# Patient Record
Sex: FEMALE | Race: BLACK OR AFRICAN AMERICAN | NOT HISPANIC OR LATINO | Employment: FULL TIME | URBAN - METROPOLITAN AREA
[De-identification: names, ages, dates, MRNs, and addresses within clinical notes are randomized per-mention and may not be internally consistent; named-entity substitution may affect disease eponyms.]

---

## 2021-04-28 ENCOUNTER — IMMUNIZATION (OUTPATIENT)
Dept: PRIMARY CARE CLINIC | Facility: CLINIC | Age: 21
End: 2021-04-28
Payer: MEDICAID

## 2021-04-28 DIAGNOSIS — Z23 NEED FOR VACCINATION: Primary | ICD-10-CM

## 2021-04-28 PROCEDURE — 91301 PR SARS-COV-2 COVID-19 VACCINE, NO PRSV, 100MCG/0.5ML, IM: ICD-10-PCS | Mod: S$GLB,,, | Performed by: INTERNAL MEDICINE

## 2021-04-28 PROCEDURE — 91301 PR SARS-COV-2 COVID-19 VACCINE, NO PRSV, 100MCG/0.5ML, IM: CPT | Mod: S$GLB,,, | Performed by: INTERNAL MEDICINE

## 2021-04-28 PROCEDURE — 0011A PR IMMUNIZ ADMIN, SARS-COV-2 COVID-19 VACC, 100MCG/0.5ML, 1ST DOSE: ICD-10-PCS | Mod: CV19,S$GLB,, | Performed by: INTERNAL MEDICINE

## 2021-04-28 PROCEDURE — 0011A PR IMMUNIZ ADMIN, SARS-COV-2 COVID-19 VACC, 100MCG/0.5ML, 1ST DOSE: CPT | Mod: CV19,S$GLB,, | Performed by: INTERNAL MEDICINE

## 2021-04-28 RX ADMIN — Medication 0.5 ML: at 06:04

## 2021-05-26 ENCOUNTER — IMMUNIZATION (OUTPATIENT)
Dept: INTERNAL MEDICINE | Facility: CLINIC | Age: 21
End: 2021-05-26
Payer: MEDICAID

## 2021-05-26 DIAGNOSIS — Z23 NEED FOR VACCINATION: Primary | ICD-10-CM

## 2021-05-26 PROCEDURE — 0012A COVID-19, MRNA, LNP-S, PF, 100 MCG/0.5 ML DOSE VACCINE: CPT | Mod: PBBFAC

## 2021-06-03 ENCOUNTER — HOSPITAL ENCOUNTER (OUTPATIENT)
Dept: RADIOLOGY | Facility: HOSPITAL | Age: 21
Discharge: HOME | End: 2021-06-03
Attending: STUDENT IN AN ORGANIZED HEALTH CARE EDUCATION/TRAINING PROGRAM
Payer: MEDICAID

## 2021-06-03 DIAGNOSIS — N63.10 BREAST MASS, RIGHT: ICD-10-CM

## 2021-06-03 PROCEDURE — 76642 US BREAST RIGHT LIMITED: ICD-10-PCS | Mod: 26,RT,, | Performed by: RADIOLOGY

## 2021-06-03 PROCEDURE — 76642 ULTRASOUND BREAST LIMITED: CPT | Mod: 26,RT,, | Performed by: RADIOLOGY

## 2021-06-03 PROCEDURE — 76642 ULTRASOUND BREAST LIMITED: CPT | Mod: TC,RT

## 2021-12-28 ENCOUNTER — HOSPITAL ENCOUNTER (EMERGENCY)
Facility: HOSPITAL | Age: 21
Discharge: HOME | End: 2021-12-28
Attending: EMERGENCY MEDICINE
Payer: MEDICAID

## 2021-12-28 VITALS
BODY MASS INDEX: 32.96 KG/M2 | WEIGHT: 210 LBS | HEART RATE: 72 BPM | OXYGEN SATURATION: 100 % | SYSTOLIC BLOOD PRESSURE: 150 MMHG | HEIGHT: 67 IN | DIASTOLIC BLOOD PRESSURE: 90 MMHG | RESPIRATION RATE: 19 BRPM | TEMPERATURE: 98 F

## 2021-12-28 DIAGNOSIS — Z20.822 COVID-19 VIRUS NOT DETECTED: Primary | ICD-10-CM

## 2021-12-28 LAB
CTP QC/QA: YES
SARS-COV-2 RDRP RESP QL NAA+PROBE: NEGATIVE

## 2021-12-28 PROCEDURE — U0002 COVID-19 LAB TEST NON-CDC: HCPCS | Performed by: EMERGENCY MEDICINE

## 2021-12-28 PROCEDURE — 99282 EMERGENCY DEPT VISIT SF MDM: CPT | Mod: CR,CS,, | Performed by: PHYSICIAN ASSISTANT

## 2021-12-28 PROCEDURE — 99282 EMERGENCY DEPT VISIT SF MDM: CPT

## 2021-12-28 PROCEDURE — 99282 PR EMERGENCY DEPT VISIT,LEVEL II: ICD-10-PCS | Mod: CR,CS,, | Performed by: PHYSICIAN ASSISTANT

## 2021-12-28 NOTE — Clinical Note
"Maira"alexey Rosario was seen and treated in our emergency department on 12/28/2021.     COVID-19 is present in our communities across the state. There is limited testing for COVID at this time, so not all patients can be tested. In this situation, your employee meets the following criteria:    Maira Rosario has met the criteria for COVID-19 testing and has a NEGATIVE result. The employee can return to work once they are asymptomatic for 72 hours without the use of fever reducing medications (Tylenol, Motrin, etc).     If you have any questions or concerns, or if I can be of further assistance, please do not hesitate to contact me.    Sincerely,             Renny Stark PA-C"

## 2021-12-29 NOTE — DISCHARGE INSTRUCTIONS
Your coronavirus test was NEGATIVE. This could be a false negative as the test is not 100% accurate. Given your symptoms, you should assume you have the virus and should act accordingly. Take over-the-counter medications such as Tylenol, Ibuprofen, Mucinex, Flonase, etc for further management of your symptoms. Hydrate by drinking plenty of water. Get plenty of sleep.    Wash your hands frequently.  Do not attend public events until health authorities  otherwise.    So far from what we know about coronavirus, the people who get very sick tend to do so around day 8 of the illness.  If you rapidly worsen, you should return to the emergency department for reassessment.    Return to the Emergency Department for symptoms including but not limited to: worsening symptoms, shortness of breath or chest pain, vomiting with inability to hold down fluids, passing out/fainting/unconsciousness, or other concerning symptoms.

## 2021-12-29 NOTE — ED NOTES
Patient identifiers verified and correct for Maira Rosario   C/C: Sore throat, exposure to COVID   APPEARANCE: awake and alert in NAD.  SKIN: warm, dry and intact. No breakdown or bruising.  MUSCULOSKELETAL: Patient moving all extremities spontaneously, no obvious swelling or deformities noted. Ambulates independently.  RESPIRATORY: Denies shortness of breath.Respirations unlabored.   CARDIAC: Denies CP, 2+ distal pulses; no peripheral edema  ABDOMEN: S/ND/NT, Denies nausea  : voids spontaneously, denies difficulty  Neurologic: AAO x 4; follows commands equal strength in all extremities; denies numbness/tingling. Denies dizziness

## 2021-12-29 NOTE — ED PROVIDER NOTES
Encounter Date: 12/28/2021       History     Chief Complaint   Patient presents with    COVID-19 Concerns     Pt reports covid exposure. Pt reports sore throat      The history is provided by the patient and medical records. No  was used.      Maira Rosario is a 21 y.o. female with medical history of R breast mass presenting to the ED with the chief complaint of COVID-19 concerns.     Patient reports a few days of sore throat, sinus congestion, rhinorrhea, dry cough. Here with family for COVID testing after +exposure. Reports she would not be in the ED otherwise. No chest pain or SOB.         Review of patient's allergies indicates:  No Known Allergies  No past medical history on file.  No past surgical history on file.  Family History   Problem Relation Age of Onset    Breast cancer Maternal Grandmother     Breast cancer Paternal Grandmother         Review of Systems   Constitutional: Negative for fever.   HENT: Positive for congestion, rhinorrhea and sore throat.    Respiratory: Positive for cough. Negative for shortness of breath.    Cardiovascular: Negative for chest pain.   Gastrointestinal: Negative for nausea.   Genitourinary: Negative for dysuria.   Musculoskeletal: Negative for back pain.   Skin: Negative for rash.   Neurological: Negative for weakness.   Hematological: Does not bruise/bleed easily.       Physical Exam     Initial Vitals [12/28/21 2126]   BP Pulse Resp Temp SpO2   (!) 150/90 72 19 98.1 °F (36.7 °C) 100 %      MAP       --         Physical Exam    Constitutional: She appears well-developed and well-nourished. She is not diaphoretic. No distress.   HENT:   Head: Normocephalic and atraumatic.   +1 BL tonsillar edema. No exudate. Uvula midline   Eyes: EOM are normal. Pupils are equal, round, and reactive to light.   Neck: Neck supple.   Normal range of motion.  Cardiovascular: Normal rate and regular rhythm.   Pulmonary/Chest: No respiratory distress.   Speaking full  sentences without difficulty. No accessory muscle use.   Musculoskeletal:         General: Normal range of motion.      Cervical back: Normal range of motion and neck supple.     Neurological: She is alert and oriented to person, place, and time.   Skin: Skin is warm and dry. No rash noted.         ED Course   Procedures  Labs Reviewed   SARS-COV-2 RDRP GENE    Narrative:     This test utilizes isothermal nucleic acid amplification   technology to detect the SARS-CoV-2 RdRp nucleic acid segment.   The analytical sensitivity (limit of detection) is 125 genome   equivalents/mL.   A POSITIVE result implies infection with the SARS-CoV-2 virus;   the patient is presumed to be contagious.     A NEGATIVE result means that SARS-CoV-2 nucleic acids are not   present above the limit of detection. A NEGATIVE result should be   treated as presumptive. It does not rule out the possibility of   COVID-19 and should not be the sole basis for treatment decisions.   If COVID-19 is strongly suspected based on clinical and exposure   history, re-testing using an alternate molecular assay should be   considered.   This test is only for use under the Food and Drug   Administration s Emergency Use Authorization (EUA).   Commercial kits are provided by [employer].   Performance characteristics of the EUA have been independently   verified by [facility] Department of   Pathology and Laboratory Medicine.   _________________________________________________________________   The authorized Fact Sheet for Healthcare Providers and the authorized Fact   Sheet for Patients of the ID NOW COVID-19 are available on the FDA   website:     https://www.fda.gov/media/387308/download  https://www.fda.gov/media/178424/download                Imaging Results    None          Medications - No data to display  Medical Decision Making:   History:   Old Medical Records: I decided to obtain old medical records.  Old Records Summarized:  records from clinic visits.  Clinical Tests:   Lab Tests: Ordered and Reviewed       APC / Resident Notes:   21 y.o. female with medical history of R breast mass presenting to the ED c/o few days of sore throat, sinus congestion, rhinorrhea, dry cough. Requesting COVID testing. DDx includes but not limited to viral syndrome, COVID-19, pneumonia, reactive airway disease, pharyngitis, sinusitis.    COVID-19 negative. Discussed possible other upper viral URI as likely etiology. Offered additional work-up, but patient comfortable returning home at this time which I feel is appropriate. Advised close follow-up with PCP within the next week. Patient expresses understanding and agreeable to the plan. Return to ED precautions given for new, worsening, or concerning symptoms.                    Clinical Impression:   Final diagnoses:  [Z20.822] COVID-19 virus not detected (Primary)          ED Disposition Condition    Discharge Stable        ED Prescriptions     None        Follow-up Information     Follow up With Specialties Details Why Contact Info Additional Information    [facility]   [address]  [facility] [id number] [phone] [facility] Please park in surface lot and check in at central registration desk           Renny Stark PA-C  12/29/21 0102

## 2022-01-10 DIAGNOSIS — R05.9 COUGH: ICD-10-CM

## 2022-01-10 DIAGNOSIS — R09.81 NASAL CONGESTION: ICD-10-CM

## 2022-01-10 DIAGNOSIS — R50.9 FEVER, UNSPECIFIED FEVER CAUSE: Primary | ICD-10-CM

## 2022-01-10 DIAGNOSIS — R52 BODY ACHES: ICD-10-CM

## 2022-01-10 LAB
CTP QC/QA: YES
SARS-COV-2 AG RESP QL IA.RAPID: POSITIVE

## 2022-01-10 PROCEDURE — 87811 SARS-COV-2 COVID19 W/OPTIC: CPT

## 2022-01-11 ENCOUNTER — PATIENT MESSAGE (OUTPATIENT)
Dept: PRIMARY CARE CLINIC | Facility: CLINIC | Age: 22
End: 2022-01-11
Payer: COMMERCIAL

## 2022-11-23 ENCOUNTER — OFFICE VISIT (OUTPATIENT)
Dept: PLASTIC SURGERY | Facility: CLINIC | Age: 22
End: 2022-11-23
Payer: COMMERCIAL

## 2022-11-23 VITALS
HEIGHT: 69 IN | DIASTOLIC BLOOD PRESSURE: 82 MMHG | WEIGHT: 210 LBS | BODY MASS INDEX: 31.1 KG/M2 | HEART RATE: 87 BPM | SYSTOLIC BLOOD PRESSURE: 132 MMHG

## 2022-11-23 DIAGNOSIS — G89.29 CHRONIC UPPER BACK PAIN: ICD-10-CM

## 2022-11-23 DIAGNOSIS — M54.9 CHRONIC UPPER BACK PAIN: ICD-10-CM

## 2022-11-23 DIAGNOSIS — G89.29 CHRONIC NECK PAIN: Primary | ICD-10-CM

## 2022-11-23 DIAGNOSIS — R21 EXCORIATED RASH: ICD-10-CM

## 2022-11-23 DIAGNOSIS — N62 MACROMASTIA: ICD-10-CM

## 2022-11-23 DIAGNOSIS — M54.2 CHRONIC NECK PAIN: Primary | ICD-10-CM

## 2022-11-23 PROCEDURE — 1159F MED LIST DOCD IN RCRD: CPT | Mod: CPTII,S$GLB,, | Performed by: SURGERY

## 2022-11-23 PROCEDURE — 99203 OFFICE O/P NEW LOW 30 MIN: CPT | Mod: S$GLB,,, | Performed by: SURGERY

## 2022-11-23 PROCEDURE — 1159F PR MEDICATION LIST DOCUMENTED IN MEDICAL RECORD: ICD-10-PCS | Mod: CPTII,S$GLB,, | Performed by: SURGERY

## 2022-11-23 PROCEDURE — 3079F DIAST BP 80-89 MM HG: CPT | Mod: CPTII,S$GLB,, | Performed by: SURGERY

## 2022-11-23 PROCEDURE — 1160F RVW MEDS BY RX/DR IN RCRD: CPT | Mod: CPTII,S$GLB,, | Performed by: SURGERY

## 2022-11-23 PROCEDURE — 3075F PR MOST RECENT SYSTOLIC BLOOD PRESS GE 130-139MM HG: ICD-10-PCS | Mod: CPTII,S$GLB,, | Performed by: SURGERY

## 2022-11-23 PROCEDURE — 1160F PR REVIEW ALL MEDS BY PRESCRIBER/CLIN PHARMACIST DOCUMENTED: ICD-10-PCS | Mod: CPTII,S$GLB,, | Performed by: SURGERY

## 2022-11-23 PROCEDURE — 3008F PR BODY MASS INDEX (BMI) DOCUMENTED: ICD-10-PCS | Mod: CPTII,S$GLB,, | Performed by: SURGERY

## 2022-11-23 PROCEDURE — 3008F BODY MASS INDEX DOCD: CPT | Mod: CPTII,S$GLB,, | Performed by: SURGERY

## 2022-11-23 PROCEDURE — 99999 PR PBB SHADOW E&M-EST. PATIENT-LVL III: CPT | Mod: PBBFAC,,, | Performed by: SURGERY

## 2022-11-23 PROCEDURE — 99213 OFFICE O/P EST LOW 20 MIN: CPT | Mod: PBBFAC | Performed by: SURGERY

## 2022-11-23 PROCEDURE — 99203 PR OFFICE/OUTPT VISIT, NEW, LEVL III, 30-44 MIN: ICD-10-PCS | Mod: S$GLB,,, | Performed by: SURGERY

## 2022-11-23 PROCEDURE — 99999 PR PBB SHADOW E&M-EST. PATIENT-LVL III: ICD-10-PCS | Mod: PBBFAC,,, | Performed by: SURGERY

## 2022-11-23 PROCEDURE — 3079F PR MOST RECENT DIASTOLIC BLOOD PRESSURE 80-89 MM HG: ICD-10-PCS | Mod: CPTII,S$GLB,, | Performed by: SURGERY

## 2022-11-23 PROCEDURE — 3075F SYST BP GE 130 - 139MM HG: CPT | Mod: CPTII,S$GLB,, | Performed by: SURGERY

## 2022-11-23 NOTE — PROGRESS NOTES
"History & Physical    SUBJECTIVE:   Chief complaint: large breasts    History of Present Illness:    Maira Rosario presents to [facility] 2ND FLOOR on 11/23/2022 for evaluation for bilateral breast reduction secondary to symptomatic bilateral large pendulous breast. She has a chief complaint of chronic neck and back pain for 7years. She has tried tylenol, narcotics, and muscle relaxer without alleviation of pain. She also complains of deep shoulder grooving from her bra straps as well as macerating rashes below each breast that have not significantly improved with application of medicated ointments and creams.  She currently reports a bra size of 40DDD. She denies smoking tobacco or the use of any nicotine containing products.       No past medical history on file.    No past surgical history on file.    Family History   Problem Relation Age of Onset    Breast cancer Maternal Grandmother     Breast cancer Paternal Grandmother        Social History     Socioeconomic History    Marital status: Single   Tobacco Use    Smoking status: Never    Smokeless tobacco: Never       Current Outpatient Medications   Medication Sig Dispense Refill    methylPREDNISolone (MEDROL, JAKE,) 4 mg tablet take as directed 21 tablet 0    phentermine (ADIPEX-P) 37.5 mg tablet Take 1 tablet (37.5 mg total) by mouth once daily. (Patient not taking: Reported on 11/23/2022) 30 tablet 0     No current facility-administered medications for this visit.       Review of patient's allergies indicates:  No Known Allergies      Review of Systems:    Review of Systems   HENT: Positive for neck pain.    Musculoskeletal: Positive for back pain.   Neurological: Negative for headaches or dizziness      OBJECTIVE:     /82 (BP Location: Left arm, Patient Position: Sitting)   Pulse 87   Ht 5' 9" (1.753 m)   Wt 95.3 kg (210 lb)   LMP 11/02/2022   BMI 31.01 kg/m²       Physical Exam:    Physical Exam   Constitutional: She is " oriented to person, place, and time. She appears well-developed and well-nourished.   Neck: Normal range of motion. Neck supple. No tracheal deviation present.   Cardiovascular: Normal rate, regular rhythm and normal heart sounds.    Pulmonary/Chest: Effort normal and breath sounds normal. bilaterally enlarged breasts, evidence of previous rashes, shoulder grooving, no palpable masses, nipple everted  Abdominal: Soft. Bowel sounds are normal.   Musculoskeletal: Normal range of motion.   Neurological: She is alert and oriented to person, place, and time.   Skin: Skin is warm.         ASSESSMENT/PLAN:     1.Symptomatic Bilateral Macromastia  2. Chronic neck pain  3. Chronic back pain  4. Chronic breast rashes    PLAN:Plan    -Will need 1400 gm reduction per side  -Will submit paper work for insurance approval  -Photos obtained  -Risk, benefits, and alternatives explained. She understands that the risks include but are not limited to bleeding, scarring, infection, pain, numbness, asymmetry, deformity, open wound, skin necrosis, wound dehiscence, permanent or temporary loss of sensation to the nipple, partial or total nipple loss requiring removal, poor cosmetic outcome, hematoma, seroma and pulmonary emobolus.   - Patient would like to proceed with scheduling bilateral breast reduction pending insurance authorization    All questions were answered. The patient was advised to call the clinic with any questions or concerns prior to their next visit.     Cece Georges PA-C  Plastic and Reconstructive Surgery   [phone]

## 2023-01-27 ENCOUNTER — TELEPHONE (OUTPATIENT)
Dept: PLASTIC SURGERY | Facility: CLINIC | Age: 23
End: 2023-01-27
Payer: COMMERCIAL

## 2023-01-27 NOTE — TELEPHONE ENCOUNTER
Called pt and scheduled surgery for 3/16/2023. Informed them that they will receive a phone call on the day before surgery with their arrival time and will get a call about one week prior to surgery with all instructions. Post-op appointment will be one week after surgery. Instructed patient to obtain PCP clearance. Understanding verbalized.

## 2023-02-01 DIAGNOSIS — G89.29 CHRONIC UPPER BACK PAIN: ICD-10-CM

## 2023-02-01 DIAGNOSIS — N62 MACROMASTIA: Primary | ICD-10-CM

## 2023-02-01 DIAGNOSIS — M54.9 CHRONIC UPPER BACK PAIN: ICD-10-CM

## 2023-02-20 ENCOUNTER — PATIENT MESSAGE (OUTPATIENT)
Dept: SURGERY | Facility: HOSPITAL | Age: 23
End: 2023-02-20
Payer: COMMERCIAL

## 2023-03-08 ENCOUNTER — PATIENT MESSAGE (OUTPATIENT)
Dept: SURGERY | Facility: HOSPITAL | Age: 23
End: 2023-03-08
Payer: COMMERCIAL

## 2023-03-09 ENCOUNTER — TELEPHONE (OUTPATIENT)
Dept: SURGERY | Facility: CLINIC | Age: 23
End: 2023-03-09
Payer: COMMERCIAL

## 2023-03-09 NOTE — TELEPHONE ENCOUNTER
Contact to patient to discuss progress on obtaining her surgery clearance.  Pt reports she does have clearance, office fax number given to patient to send.  Discussed and confirmed pts email address with patient.  Notified pt of general surgery packet information to be emailed for her review and may call for any questions or needs.  Call ended.

## 2023-03-15 ENCOUNTER — TELEPHONE (OUTPATIENT)
Dept: PLASTIC SURGERY | Facility: CLINIC | Age: 23
End: 2023-03-15
Payer: COMMERCIAL

## 2023-03-15 ENCOUNTER — ANESTHESIA EVENT (OUTPATIENT)
Dept: SURGERY | Facility: HOSPITAL | Age: 23
End: 2023-03-15
Payer: COMMERCIAL

## 2023-03-15 RX ORDER — CETIRIZINE HYDROCHLORIDE 10 MG/1
10 TABLET ORAL DAILY
COMMUNITY

## 2023-03-15 NOTE — ANESTHESIA PREPROCEDURE EVALUATION
03/15/2023  Pre-operative evaluation for Procedure(s) (LRB):  MAMMOPLASTY, REDUCTION, BILATERAL (Bilateral)    Maira Rosario is a 22 y.o. female     There is no problem list on file for this patient.      Review of patient's allergies indicates:  No Known Allergies    No current facility-administered medications on file prior to encounter.     Current Outpatient Medications on File Prior to Encounter   Medication Sig Dispense Refill    cetirizine (ZYRTEC) 10 MG tablet Take 10 mg by mouth once daily.      methylPREDNISolone (MEDROL, JAKE,) 4 mg tablet take as directed 21 tablet 0    phentermine (ADIPEX-P) 37.5 mg tablet Take 1 tablet (37.5 mg total) by mouth once daily. (Patient not taking: Reported on 2022) 30 tablet 0       No past surgical history on file.    Social History     Socioeconomic History    Marital status: Single   Tobacco Use    Smoking status: Never    Smokeless tobacco: Never         CBC: No results for input(s): WBC, RBC, HGB, HCT, PLT, MCV, MCH, MCHC in the last 72 hours.    CMP: No results for input(s): NA, K, CL, CO2, BUN, CREATININE, GLU, MG, PHOS, CALCIUM, ALBUMIN, PROT, ALKPHOS, ALT, AST, BILITOT in the last 72 hours.    INR  No results for input(s): PT, INR, PROTIME, APTT in the last 72 hours.        Diagnostic Studies:      EKD Echo:  No results found for this or any previous visit.      Pre-op Assessment    I have reviewed the Patient Summary Reports.     I have reviewed the Nursing Notes. I have reviewed the NPO Status.   I have reviewed the Medications.     Review of Systems      Physical Exam  General: Well nourished and Cooperative    Airway:  Mallampati: II   Mouth Opening: Normal  TM Distance: Normal  Tongue: Normal  Neck ROM: Normal ROM    Chest/Lungs:  Clear to auscultation, Normal Respiratory Rate    Heart:  Rate: Normal  Rhythm: Regular  Rhythm  Sounds: Normal        Anesthesia Plan  Type of Anesthesia, risks & benefits discussed:    Anesthesia Type: Gen ETT  Intra-op Monitoring Plan: Standard ASA Monitors  Post Op Pain Control Plan: multimodal analgesia and IV/PO Opioids PRN  Induction:  IV  Airway Plan: Direct and Video, Post-Induction  Informed Consent: Informed consent signed with the Patient and all parties understand the risks and agree with anesthesia plan.  All questions answered.   ASA Score: 2    Ready For Surgery From Anesthesia Perspective.     .

## 2023-03-16 ENCOUNTER — ANESTHESIA (OUTPATIENT)
Dept: SURGERY | Facility: HOSPITAL | Age: 23
End: 2023-03-16
Payer: COMMERCIAL

## 2023-03-16 ENCOUNTER — HOSPITAL ENCOUNTER (OUTPATIENT)
Facility: HOSPITAL | Age: 23
Discharge: HOME | End: 2023-03-16
Attending: SURGERY | Admitting: SURGERY
Payer: COMMERCIAL

## 2023-03-16 VITALS
DIASTOLIC BLOOD PRESSURE: 83 MMHG | WEIGHT: 212 LBS | HEART RATE: 70 BPM | BODY MASS INDEX: 31.4 KG/M2 | SYSTOLIC BLOOD PRESSURE: 117 MMHG | TEMPERATURE: 98 F | RESPIRATION RATE: 16 BRPM | OXYGEN SATURATION: 100 % | HEIGHT: 69 IN

## 2023-03-16 DIAGNOSIS — N62 MACROMASTIA: Primary | ICD-10-CM

## 2023-03-16 LAB
B-HCG UR QL: POSITIVE
CTP QC/QA: YES

## 2023-03-16 PROCEDURE — D9220A PRA ANESTHESIA: Mod: ,,, | Performed by: STUDENT IN AN ORGANIZED HEALTH CARE EDUCATION/TRAINING PROGRAM

## 2023-03-16 PROCEDURE — 71000044 HC DOSC ROUTINE RECOVERY FIRST HOUR: Performed by: SURGERY

## 2023-03-16 PROCEDURE — D9220A PRA ANESTHESIA: ICD-10-PCS | Mod: ,,, | Performed by: STUDENT IN AN ORGANIZED HEALTH CARE EDUCATION/TRAINING PROGRAM

## 2023-03-16 PROCEDURE — 19318 PR REDUCTION OF LARGE BREAST: ICD-10-PCS | Mod: 50,,, | Performed by: SURGERY

## 2023-03-16 PROCEDURE — 36000706: Performed by: SURGERY

## 2023-03-16 PROCEDURE — 81025 URINE PREGNANCY TEST: CPT | Performed by: STUDENT IN AN ORGANIZED HEALTH CARE EDUCATION/TRAINING PROGRAM

## 2023-03-16 PROCEDURE — 25000003 PHARM REV CODE 250: Performed by: SURGERY

## 2023-03-16 PROCEDURE — 27201423 OPTIME MED/SURG SUP & DEVICES STERILE SUPPLY: Performed by: SURGERY

## 2023-03-16 PROCEDURE — 76942 ECHO GUIDE FOR BIOPSY: CPT | Performed by: SURGERY

## 2023-03-16 PROCEDURE — 63600175 PHARM REV CODE 636 W HCPCS: Performed by: STUDENT IN AN ORGANIZED HEALTH CARE EDUCATION/TRAINING PROGRAM

## 2023-03-16 PROCEDURE — 25000003 PHARM REV CODE 250: Performed by: STUDENT IN AN ORGANIZED HEALTH CARE EDUCATION/TRAINING PROGRAM

## 2023-03-16 PROCEDURE — 19318 BREAST REDUCTION: CPT | Mod: 50,,, | Performed by: SURGERY

## 2023-03-16 PROCEDURE — 71000016 HC POSTOP RECOV ADDL HR: Performed by: SURGERY

## 2023-03-16 PROCEDURE — 88305 TISSUE EXAM BY PATHOLOGIST: ICD-10-PCS | Mod: 26,,, | Performed by: STUDENT IN AN ORGANIZED HEALTH CARE EDUCATION/TRAINING PROGRAM

## 2023-03-16 PROCEDURE — 36000707: Performed by: SURGERY

## 2023-03-16 PROCEDURE — 64999 UNLISTED PX NERVOUS SYSTEM: CPT | Mod: ,,, | Performed by: SURGERY

## 2023-03-16 PROCEDURE — 37000009 HC ANESTHESIA EA ADD 15 MINS: Performed by: SURGERY

## 2023-03-16 PROCEDURE — 37000008 HC ANESTHESIA 1ST 15 MINUTES: Performed by: SURGERY

## 2023-03-16 PROCEDURE — 88305 TISSUE EXAM BY PATHOLOGIST: CPT | Mod: 26,,, | Performed by: STUDENT IN AN ORGANIZED HEALTH CARE EDUCATION/TRAINING PROGRAM

## 2023-03-16 PROCEDURE — 88305 TISSUE EXAM BY PATHOLOGIST: CPT | Performed by: STUDENT IN AN ORGANIZED HEALTH CARE EDUCATION/TRAINING PROGRAM

## 2023-03-16 PROCEDURE — 71000015 HC POSTOP RECOV 1ST HR: Performed by: SURGERY

## 2023-03-16 PROCEDURE — C1729 CATH, DRAINAGE: HCPCS | Performed by: SURGERY

## 2023-03-16 PROCEDURE — 64999 BILATERAL ESP SINGLE INJECTIONS: ICD-10-PCS | Mod: ,,, | Performed by: SURGERY

## 2023-03-16 RX ORDER — HALOPERIDOL 5 MG/ML
0.5 INJECTION INTRAMUSCULAR EVERY 10 MIN PRN
Status: DISCONTINUED | OUTPATIENT
Start: 2023-03-16 | End: 2023-03-16 | Stop reason: HOSPADM

## 2023-03-16 RX ORDER — OXYCODONE AND ACETAMINOPHEN 5; 325 MG/1; MG/1
1 TABLET ORAL EVERY 4 HOURS PRN
Qty: 28 TABLET | Refills: 0 | Status: SHIPPED | OUTPATIENT
Start: 2023-03-16

## 2023-03-16 RX ORDER — LIDOCAINE HYDROCHLORIDE 10 MG/ML
1 INJECTION, SOLUTION EPIDURAL; INFILTRATION; INTRACAUDAL; PERINEURAL ONCE
Status: DISCONTINUED | OUTPATIENT
Start: 2023-03-16 | End: 2023-03-16 | Stop reason: HOSPADM

## 2023-03-16 RX ORDER — ONDANSETRON 2 MG/ML
4 INJECTION INTRAMUSCULAR; INTRAVENOUS DAILY PRN
Status: DISCONTINUED | OUTPATIENT
Start: 2023-03-16 | End: 2023-03-16 | Stop reason: HOSPADM

## 2023-03-16 RX ORDER — HYDROMORPHONE HYDROCHLORIDE 1 MG/ML
0.2 INJECTION, SOLUTION INTRAMUSCULAR; INTRAVENOUS; SUBCUTANEOUS EVERY 5 MIN PRN
Status: DISCONTINUED | OUTPATIENT
Start: 2023-03-16 | End: 2023-03-16 | Stop reason: HOSPADM

## 2023-03-16 RX ORDER — CEFAZOLIN SODIUM 1 G/3ML
INJECTION, POWDER, FOR SOLUTION INTRAMUSCULAR; INTRAVENOUS
Status: DISCONTINUED | OUTPATIENT
Start: 2023-03-16 | End: 2023-03-16

## 2023-03-16 RX ORDER — KETAMINE HCL IN 0.9 % NACL 50 MG/5 ML
SYRINGE (ML) INTRAVENOUS
Status: DISCONTINUED | OUTPATIENT
Start: 2023-03-16 | End: 2023-03-16

## 2023-03-16 RX ORDER — HEPARIN SODIUM 5000 [USP'U]/ML
5000 INJECTION, SOLUTION INTRAVENOUS; SUBCUTANEOUS ONCE
Status: COMPLETED | OUTPATIENT
Start: 2023-03-16 | End: 2023-03-16

## 2023-03-16 RX ORDER — ROCURONIUM BROMIDE 10 MG/ML
INJECTION, SOLUTION INTRAVENOUS
Status: DISCONTINUED | OUTPATIENT
Start: 2023-03-16 | End: 2023-03-16

## 2023-03-16 RX ORDER — METHOCARBAMOL 500 MG/1
500 TABLET, FILM COATED ORAL 4 TIMES DAILY
Qty: 40 TABLET | Refills: 0 | Status: SHIPPED | OUTPATIENT
Start: 2023-03-16 | End: 2023-03-26

## 2023-03-16 RX ORDER — MUPIROCIN 20 MG/G
OINTMENT TOPICAL
Status: DISCONTINUED | OUTPATIENT
Start: 2023-03-16 | End: 2023-03-16 | Stop reason: HOSPADM

## 2023-03-16 RX ORDER — ONDANSETRON 2 MG/ML
INJECTION INTRAMUSCULAR; INTRAVENOUS
Status: DISCONTINUED | OUTPATIENT
Start: 2023-03-16 | End: 2023-03-16

## 2023-03-16 RX ORDER — LIDOCAINE HYDROCHLORIDE AND EPINEPHRINE 10; 10 MG/ML; UG/ML
INJECTION, SOLUTION INFILTRATION; PERINEURAL
Status: DISCONTINUED | OUTPATIENT
Start: 2023-03-16 | End: 2023-03-16 | Stop reason: HOSPADM

## 2023-03-16 RX ORDER — DEXAMETHASONE SODIUM PHOSPHATE 4 MG/ML
INJECTION, SOLUTION INTRA-ARTICULAR; INTRALESIONAL; INTRAMUSCULAR; INTRAVENOUS; SOFT TISSUE
Status: DISCONTINUED | OUTPATIENT
Start: 2023-03-16 | End: 2023-03-16

## 2023-03-16 RX ORDER — NEOSTIGMINE METHYLSULFATE 0.5 MG/ML
INJECTION, SOLUTION INTRAVENOUS
Status: DISCONTINUED | OUTPATIENT
Start: 2023-03-16 | End: 2023-03-16

## 2023-03-16 RX ORDER — CEPHALEXIN 500 MG/1
500 CAPSULE ORAL EVERY 6 HOURS
Qty: 28 CAPSULE | Refills: 0 | Status: SHIPPED | OUTPATIENT
Start: 2023-03-16

## 2023-03-16 RX ORDER — BUPIVACAINE HYDROCHLORIDE 7.5 MG/ML
INJECTION, SOLUTION EPIDURAL; RETROBULBAR
Status: COMPLETED | OUTPATIENT
Start: 2023-03-16 | End: 2023-03-16

## 2023-03-16 RX ORDER — MIDAZOLAM HYDROCHLORIDE 1 MG/ML
INJECTION, SOLUTION INTRAMUSCULAR; INTRAVENOUS
Status: DISCONTINUED | OUTPATIENT
Start: 2023-03-16 | End: 2023-03-16

## 2023-03-16 RX ORDER — FENTANYL CITRATE 50 UG/ML
25 INJECTION, SOLUTION INTRAMUSCULAR; INTRAVENOUS EVERY 5 MIN PRN
Status: DISCONTINUED | OUTPATIENT
Start: 2023-03-16 | End: 2023-03-16 | Stop reason: HOSPADM

## 2023-03-16 RX ORDER — FENTANYL CITRATE 50 UG/ML
INJECTION, SOLUTION INTRAMUSCULAR; INTRAVENOUS
Status: DISCONTINUED | OUTPATIENT
Start: 2023-03-16 | End: 2023-03-16

## 2023-03-16 RX ORDER — MUPIROCIN 20 MG/G
OINTMENT TOPICAL 2 TIMES DAILY
Status: DISCONTINUED | OUTPATIENT
Start: 2023-03-16 | End: 2023-03-16 | Stop reason: HOSPADM

## 2023-03-16 RX ORDER — SODIUM CHLORIDE 0.9 % (FLUSH) 0.9 %
10 SYRINGE (ML) INJECTION
Status: DISCONTINUED | OUTPATIENT
Start: 2023-03-16 | End: 2023-03-16 | Stop reason: HOSPADM

## 2023-03-16 RX ORDER — OXYCODONE HYDROCHLORIDE 5 MG/1
5 TABLET ORAL
Status: DISCONTINUED | OUTPATIENT
Start: 2023-03-16 | End: 2023-03-16 | Stop reason: HOSPADM

## 2023-03-16 RX ORDER — PROPOFOL 10 MG/ML
VIAL (ML) INTRAVENOUS
Status: DISCONTINUED | OUTPATIENT
Start: 2023-03-16 | End: 2023-03-16

## 2023-03-16 RX ORDER — PHENYLEPHRINE HYDROCHLORIDE 10 MG/ML
INJECTION INTRAVENOUS
Status: DISCONTINUED | OUTPATIENT
Start: 2023-03-16 | End: 2023-03-16

## 2023-03-16 RX ORDER — HYDROCODONE BITARTRATE AND ACETAMINOPHEN 5; 325 MG/1; MG/1
1 TABLET ORAL EVERY 4 HOURS PRN
Status: DISCONTINUED | OUTPATIENT
Start: 2023-03-16 | End: 2023-03-16 | Stop reason: HOSPADM

## 2023-03-16 RX ORDER — ONDANSETRON 4 MG/1
4 TABLET, ORALLY DISINTEGRATING ORAL EVERY 6 HOURS PRN
Qty: 30 TABLET | Refills: 0 | Status: SHIPPED | OUTPATIENT
Start: 2023-03-16

## 2023-03-16 RX ORDER — LIDOCAINE HYDROCHLORIDE 20 MG/ML
INJECTION INTRAVENOUS
Status: DISCONTINUED | OUTPATIENT
Start: 2023-03-16 | End: 2023-03-16

## 2023-03-16 RX ADMIN — GLYCOPYRROLATE 0.4 MG: 0.2 INJECTION, SOLUTION INTRAMUSCULAR; INTRAVENOUS at 11:03

## 2023-03-16 RX ADMIN — ROCURONIUM BROMIDE 50 MG: 10 INJECTION INTRAVENOUS at 07:03

## 2023-03-16 RX ADMIN — PHENYLEPHRINE HYDROCHLORIDE 100 MCG: 10 INJECTION INTRAVENOUS at 09:03

## 2023-03-16 RX ADMIN — HEPARIN SODIUM 5000 UNITS: 5000 INJECTION INTRAVENOUS; SUBCUTANEOUS at 06:03

## 2023-03-16 RX ADMIN — Medication 10 MG: at 11:03

## 2023-03-16 RX ADMIN — FENTANYL CITRATE 100 MCG: 50 INJECTION, SOLUTION INTRAMUSCULAR; INTRAVENOUS at 07:03

## 2023-03-16 RX ADMIN — Medication 10 MG: at 09:03

## 2023-03-16 RX ADMIN — ONDANSETRON 4 MG: 2 INJECTION INTRAMUSCULAR; INTRAVENOUS at 11:03

## 2023-03-16 RX ADMIN — MUPIROCIN: 20 OINTMENT TOPICAL at 06:03

## 2023-03-16 RX ADMIN — NEOSTIGMINE METHYLSULFATE 4 MG: 0.5 INJECTION, SOLUTION INTRAVENOUS at 11:03

## 2023-03-16 RX ADMIN — SODIUM CHLORIDE: 0.9 INJECTION, SOLUTION INTRAVENOUS at 07:03

## 2023-03-16 RX ADMIN — HYDROMORPHONE HYDROCHLORIDE 0.2 MG: 1 INJECTION, SOLUTION INTRAMUSCULAR; INTRAVENOUS; SUBCUTANEOUS at 12:03

## 2023-03-16 RX ADMIN — CEFAZOLIN 2 G: 330 INJECTION, POWDER, FOR SOLUTION INTRAMUSCULAR; INTRAVENOUS at 08:03

## 2023-03-16 RX ADMIN — PROPOFOL 250 MG: 10 INJECTION, EMULSION INTRAVENOUS at 07:03

## 2023-03-16 RX ADMIN — HYDROCODONE BITARTRATE AND ACETAMINOPHEN 1 TABLET: 5; 325 TABLET ORAL at 12:03

## 2023-03-16 RX ADMIN — DEXAMETHASONE SODIUM PHOSPHATE 4 MG: 4 INJECTION, SOLUTION INTRAMUSCULAR; INTRAVENOUS at 08:03

## 2023-03-16 RX ADMIN — BUPIVACAINE HYDROCHLORIDE 30 ML: 7.5 INJECTION, SOLUTION EPIDURAL; RETROBULBAR at 07:03

## 2023-03-16 RX ADMIN — LIDOCAINE HYDROCHLORIDE 100 MG: 20 INJECTION INTRAVENOUS at 07:03

## 2023-03-16 RX ADMIN — MIDAZOLAM HYDROCHLORIDE 2 MG: 1 INJECTION, SOLUTION INTRAMUSCULAR; INTRAVENOUS at 07:03

## 2023-03-16 RX ADMIN — Medication 20 MG: at 08:03

## 2023-03-16 NOTE — ANESTHESIA POSTPROCEDURE EVALUATION
Anesthesia Post Evaluation    Patient: Maira Rosario    Procedure(s) Performed: Procedure(s) (LRB):  MAMMOPLASTY, REDUCTION, BILATERAL (Bilateral)    Final Anesthesia Type: general      Patient location during evaluation: PACU  Patient participation: Yes- Able to Participate  Level of consciousness: awake and alert  Post-procedure vital signs: reviewed and stable  Pain management: adequate  Airway patency: patent  NATALIE mitigation strategies: Multimodal analgesia  PONV status at discharge: No PONV  Anesthetic complications: no      Cardiovascular status: blood pressure returned to baseline and hemodynamically stable  Respiratory status: unassisted  Hydration status: euvolemic  Follow-up not needed.          Vitals Value Taken Time   /81 03/16/23 1232   Temp 36.7 °C (98 °F) 03/16/23 1145   Pulse 64 03/16/23 1237   Resp 16 03/16/23 1228   SpO2 100 % 03/16/23 1237   Vitals shown include unvalidated device data.      No case tracking events are documented in the log.      Pain/Gabrielle Score: Pain Rating Prior to Med Admin: 6 (3/16/2023 12:28 PM)  Gabrielle Score: 9 (3/16/2023 12:15 PM)

## 2023-03-16 NOTE — ANESTHESIA RELEASE NOTE
"Anesthesia Release from PACU Note    Patient: Maira Rosario    Procedure(s) Performed: Procedure(s) (LRB):  MAMMOPLASTY, REDUCTION, BILATERAL (Bilateral)    Anesthesia type: general    Post pain: Adequate analgesia    Post assessment: no apparent anesthetic complications    Last Vitals:   Visit Vitals  /75   Pulse 83   Temp 36.7 °C (98 °F) (Temporal)   Resp 16   Ht 5' 9" (1.753 m)   Wt 96.2 kg (212 lb)   SpO2 100%   Breastfeeding No   BMI 31.31 kg/m²       Post vital signs: stable    Level of consciousness: awake    Nausea/Vomiting: no nausea/no vomiting    Complications: none    Airway Patency: patent    Respiratory: unassisted    Cardiovascular: stable and blood pressure at baseline    Hydration: euvolemic  "

## 2023-03-16 NOTE — ANESTHESIA PROCEDURE NOTES
Bilateral LULU Single Injections    Patient location during procedure: pre-op   Block not for primary anesthetic.  Reason for block: at surgeon's request and post-op pain management   Post-op Pain Location: bilateral Chest Wall pain   Start time: 3/16/2023 7:41 AM  Timeout: 3/16/2023 7:40 AM   End time: 3/16/2023 7:45 AM    Staffing  Authorizing Provider: Ward Rivera MD  Performing Provider: Faustino Wilder MD    Preanesthetic Checklist  Completed: patient identified, IV checked, site marked, risks and benefits discussed, surgical consent, monitors and equipment checked, pre-op evaluation and timeout performed  Peripheral Block  Patient position: sitting  Prep: ChloraPrep  Patient monitoring: heart rate, cardiac monitor, continuous pulse ox, continuous capnometry and frequent blood pressure checks  Block type: erector spinae plane  Laterality: bilateral  Injection technique: single shot  Interspace: T4-5    Needle  Needle type: Tuohy   Needle gauge: 17 G  Needle length: 3.5 in  Needle localization: anatomical landmarks and ultrasound guidance   -ultrasound image captured on disc.  Assessment  Injection assessment: negative aspiration, negative parasthesia and local visualized surrounding nerve  Paresthesia pain: none  Heart rate change: no  Slow fractionated injection: yes    Medications:    Medications: bupivacaine (pf) (MARCAINE) injection 0.75% - Perineural   30 mL - 3/16/2023 7:45:00 AM    Additional Notes  A time out was conducted. Site nicholas confirmed with team and patient. Allergies reviewed.   Vital signs stable throughout block. RN monitoring vitals throughout.   Needle advanced under continuous ultrasound guidance.  Local injected incrementally after confirming negative aspiration. No signs or symptoms of intravascular or intraneural injection noted.   No persistent paresthesias elicited or expressed. Patient tolerated procedure well.  30 cc of 0.375% with epinephrine 1:300K, PF dexamethasone 1 mg, and  clonidine 50 mcg used for the block on each side.

## 2023-03-16 NOTE — OP NOTE
beta-blocker  Date of Surgery: 3/16  Pre op Diagnosis:  1 macromastia  2 chronic neck pain  3 chronic back pain  4 skin rash  Post op Diagnosis:  same  Procedure performed: bilateral breast reduction  Surgeon: Dr Casa Mcgrath  Anesthesia: general  Complications none  Blood loss 150cc      The patient was evaluated in the preoperative holding area. Markings for the inferior pedicle bilateral breast reduction were made. The risks and possible complications including but not limited to; infection, bleeding, scarring, loss of sensation to the nipple, partial or total loss of the nipple, breast asymmetry, breast deformity, open wounds and need for further surgery were all explained to the patient. The patient signed the informed consent.     The patient was taken to the operating room and placed in the supine position. After adequate general endotracheal anesthesia, the patient was prepped and draped in the normal sterile fashion. The new nipple areola was marked measuring 42mm. The inferior pedicle was marked measuring 11cm at its base. The inferior pedicle was de-epithelialized. Good punctate bleeding was noted. The supra- medial and supra-lateral flaps were elevated to the clavicle superiorly, the lateral border of the sternum medially and the mid-axillary line laterally. The medial and lateral skin wedges were excise. The inferior pedicle was debulked. A similar procedure was performed on the opposite side. The incision's were closed using interrupted 3.0 monocryl followed by a running 4.0 monocryl subcuticular suture for the infra-mammary incision. The vertical limb and nipple areolar complex were closed using interrupted 4.0 monocryl followed by a running 4.0 monocryl subcuticular suture. A similar closure was performed on the opposite side.    Weight removed right breast: 995  Weight removed left breast.945

## 2023-03-16 NOTE — ANESTHESIA PROCEDURE NOTES
Intubation    Date/Time: 3/16/2023 7:50 AM  Performed by: Ward Rivera MD  Authorized by: Ward Rivera MD     Intubation:     Induction:  Intravenous    Intubated:  Postinduction    Mask Ventilation:  Easy mask    Attempts:  1    Attempted By:  Staff anesthesiologist    Method of Intubation:  Direct    Blade:  Maria 3    Laryngeal View Grade: Grade I - full view of cords      Difficult Airway Encountered?: No      Complications:  None    Airway Device:  Oral endotracheal tube    Airway Device Size:  7.5    Style/Cuff Inflation:  Cuffed    Inflation Amount (mL):  7    Tube secured:  23    Placement Verified By:  Capnometry    Complicating Factors:  None    Findings Post-Intubation:  Atraumatic/condition of teeth unchanged and BS equal bilateral

## 2023-03-16 NOTE — H&P
"History & Physical     SUBJECTIVE:   Chief complaint: large breasts     History of Present Illness:     Maira Rosario presents  for bilateral breast reduction secondary to symptomatic bilateral large pendulous breast. She has a chief complaint of chronic neck and back pain for 7years. She has tried tylenol, narcotics, and muscle relaxer without alleviation of pain. She also complains of deep shoulder grooving from her bra straps as well as macerating rashes below each breast that have not significantly improved with application of medicated ointments and creams.  She currently reports a bra size of 40DDD. She denies smoking tobacco or the use of any nicotine containing products.         No past medical history on file.     No past surgical history on file.           Family History   Problem Relation Age of Onset    Breast cancer Maternal Grandmother      Breast cancer Paternal Grandmother           Social History           Socioeconomic History    Marital status: Single   Tobacco Use    Smoking status: Never    Smokeless tobacco: Never         Current Medications          Current Outpatient Medications   Medication Sig Dispense Refill    methylPREDNISolone (MEDROL, JAKE,) 4 mg tablet take as directed 21 tablet 0    phentermine (ADIPEX-P) 37.5 mg tablet Take 1 tablet (37.5 mg total) by mouth once daily. (Patient not taking: Reported on 11/23/2022) 30 tablet 0      No current facility-administered medications for this visit.            Review of patient's allergies indicates:  No Known Allergies        Review of Systems:     Review of Systems   HENT: Positive for neck pain.    Musculoskeletal: Positive for back pain.   Neurological: Negative for headaches or dizziness        OBJECTIVE:      /82 (BP Location: Left arm, Patient Position: Sitting)   Pulse 87   Ht 5' 9" (1.753 m)   Wt 95.3 kg (210 lb)   LMP 11/02/2022   BMI 31.01 kg/m²         Physical Exam:     Physical Exam   Constitutional: She is " oriented to person, place, and time. She appears well-developed and well-nourished.   Neck: Normal range of motion. Neck supple. No tracheal deviation present.   Cardiovascular: Normal rate, regular rhythm and normal heart sounds.    Pulmonary/Chest: Effort normal and breath sounds normal. bilaterally enlarged breasts, evidence of previous rashes, shoulder grooving, no palpable masses, nipple everted  Abdominal: Soft. Bowel sounds are normal.   Musculoskeletal: Normal range of motion.   Neurological: She is alert and oriented to person, place, and time.   Skin: Skin is warm.            ASSESSMENT/PLAN:      1.Symptomatic Bilateral Macromastia  2. Chronic neck pain  3. Chronic back pain  4. Chronic breast rashes     PLAN:    -Plan for OR     -Will need 1400 gm reduction per side    -Risk, benefits, and alternatives explained. She understands that the risks include but are not limited to bleeding, scarring, infection, pain, numbness, asymmetry, deformity, open wound, skin necrosis, wound dehiscence, permanent or temporary loss of sensation to the nipple, partial or total nipple loss requiring removal, poor cosmetic outcome, hematoma, seroma and pulmonary emobolus.

## 2023-03-22 ENCOUNTER — OFFICE VISIT (OUTPATIENT)
Dept: PLASTIC SURGERY | Facility: CLINIC | Age: 23
End: 2023-03-22
Payer: COMMERCIAL

## 2023-03-22 VITALS — SYSTOLIC BLOOD PRESSURE: 133 MMHG | HEART RATE: 84 BPM | OXYGEN SATURATION: 98 % | DIASTOLIC BLOOD PRESSURE: 73 MMHG

## 2023-03-22 DIAGNOSIS — Z09 SURGERY FOLLOW-UP EXAMINATION: Primary | ICD-10-CM

## 2023-03-22 PROCEDURE — 1159F PR MEDICATION LIST DOCUMENTED IN MEDICAL RECORD: ICD-10-PCS | Mod: CPTII,S$GLB,, | Performed by: SURGERY

## 2023-03-22 PROCEDURE — 3075F PR MOST RECENT SYSTOLIC BLOOD PRESS GE 130-139MM HG: ICD-10-PCS | Mod: CPTII,S$GLB,, | Performed by: SURGERY

## 2023-03-22 PROCEDURE — 1159F MED LIST DOCD IN RCRD: CPT | Mod: CPTII,S$GLB,, | Performed by: SURGERY

## 2023-03-22 PROCEDURE — 99999 PR PBB SHADOW E&M-EST. PATIENT-LVL III: ICD-10-PCS | Mod: PBBFAC,,, | Performed by: SURGERY

## 2023-03-22 PROCEDURE — 3078F DIAST BP <80 MM HG: CPT | Mod: CPTII,S$GLB,, | Performed by: SURGERY

## 2023-03-22 PROCEDURE — 3075F SYST BP GE 130 - 139MM HG: CPT | Mod: CPTII,S$GLB,, | Performed by: SURGERY

## 2023-03-22 PROCEDURE — 99024 PR POST-OP FOLLOW-UP VISIT: ICD-10-PCS | Mod: S$GLB,,, | Performed by: SURGERY

## 2023-03-22 PROCEDURE — 99999 PR PBB SHADOW E&M-EST. PATIENT-LVL III: CPT | Mod: PBBFAC,,, | Performed by: SURGERY

## 2023-03-22 PROCEDURE — 99024 POSTOP FOLLOW-UP VISIT: CPT | Mod: S$GLB,,, | Performed by: SURGERY

## 2023-03-22 PROCEDURE — 3078F PR MOST RECENT DIASTOLIC BLOOD PRESSURE < 80 MM HG: ICD-10-PCS | Mod: CPTII,S$GLB,, | Performed by: SURGERY

## 2023-03-22 NOTE — PROGRESS NOTES
Plastic Surgery Clinic Postop Visit    Subjective:      Maira Rosario is a 22 y.o. year old female who presents to the Plastic Surgery Clinic on 03/22/2023 for follow up visit status post bilateral breast reduction one week ago. She has been recovering appropriately and abiding by the lifting and bathing restrictions. She endorsed some nausea for the first couple of days that responded well to zofran. She states that her pain has resolved and she hasn't had to take any pain medications for the last 2 days. She has been recording her drain output and her outputs have decreased over the past few days down to 10-20 mL per day. Denies fever, chills, nausea, vomiting, or other systemic signs of infection.    Vitals:    03/22/23 0930   BP: 133/73   Pulse: 84        Review of patient's allergies indicates:  No Known Allergies    Current Outpatient Medications on File Prior to Visit   Medication Sig Dispense Refill    amLODIPine (NORVASC) 5 MG tablet 1 tablet Orally Once a day 90 days 90 tablet 3    cephALEXin (KEFLEX) 500 MG capsule Take 1 capsule (500 mg total) by mouth every 6 (six) hours. 28 capsule 0    cetirizine (ZYRTEC) 10 MG tablet Take 10 mg by mouth once daily.      methocarbamoL (ROBAXIN) 500 MG Tab Take 1 tablet (500 mg total) by mouth 4 (four) times daily. for 10 days 40 tablet 0    ondansetron (ZOFRAN-ODT) 4 MG TbDL Dissolve 1 tablet (4 mg total) by mouth every 6 (six) hours as needed (nausea). 30 tablet 0    oxyCODONE-acetaminophen (PERCOCET) 5-325 mg per tablet Take 1 tablet by mouth every 4 (four) hours as needed for Pain. 28 tablet 0    phentermine (ADIPEX-P) 37.5 mg tablet Take 1 tablet (37.5 mg total) by mouth once daily. 30 tablet 0     No current facility-administered medications on file prior to visit.       Patient Active Problem List   Diagnosis    Macromastia       Past Surgical History:   Procedure Laterality Date    REDUCTION OF BOTH BREASTS Bilateral 3/16/2023    Procedure:  MAMMOPLASTY, REDUCTION, BILATERAL;  Surgeon: Casa Mcgrath MD;  Location: [facility] OR [address] [place] [address];  Service: Plastics;  Laterality: Bilateral;  Left breast tissue 960g  Right breast tissue 1014g    TONSILLECTOMY         Social History     Socioeconomic History    Marital status: Single   Tobacco Use    Smoking status: Never    Smokeless tobacco: Never   Substance and Sexual Activity    Alcohol use: Yes     Comment: occassional    Drug use: Never    Sexual activity: Not Currently     Birth control/protection: Abstinence           Review of Systems: Negative    Objective:     Physical Exam:  Vitals:    03/22/23 0930   BP: 133/73   Pulse: 84       WD WN NAD  VSS  Normal resp effort  Bilateral incisions healing appropriately. MARILY drains with minimal SS drainage. Sensation intact throughout.        Assessment:       S/p Bilateral breast reduction.    Plan:   22 y.o. female status post b/l breast reduction on 3/16. Progressing appropriately. MARILY drains removed.  - Doing well, no issues  - Continue bacitracin 3 times per day for another week  - Return to clinic in 2 weeks.       All questions were answered. The patient was advised to call the clinic with any questions or concerns prior to their next visit.       Say Hernández MD  General Surgery Resident

## 2023-03-23 LAB
FINAL PATHOLOGIC DIAGNOSIS: NORMAL
GROSS: NORMAL
Lab: NORMAL

## 2023-04-05 ENCOUNTER — OFFICE VISIT (OUTPATIENT)
Dept: PLASTIC SURGERY | Facility: CLINIC | Age: 23
End: 2023-04-05
Payer: COMMERCIAL

## 2023-04-05 VITALS — HEART RATE: 7 BPM | OXYGEN SATURATION: 99 % | DIASTOLIC BLOOD PRESSURE: 84 MMHG | SYSTOLIC BLOOD PRESSURE: 135 MMHG

## 2023-04-05 DIAGNOSIS — Z09 SURGERY FOLLOW-UP EXAMINATION: Primary | ICD-10-CM

## 2023-04-05 PROCEDURE — 99999 PR PBB SHADOW E&M-EST. PATIENT-LVL III: ICD-10-PCS | Mod: PBBFAC,,,

## 2023-04-05 PROCEDURE — 1160F RVW MEDS BY RX/DR IN RCRD: CPT | Mod: CPTII,S$GLB,,

## 2023-04-05 PROCEDURE — 3075F SYST BP GE 130 - 139MM HG: CPT | Mod: CPTII,S$GLB,,

## 2023-04-05 PROCEDURE — 1160F PR REVIEW ALL MEDS BY PRESCRIBER/CLIN PHARMACIST DOCUMENTED: ICD-10-PCS | Mod: CPTII,S$GLB,,

## 2023-04-05 PROCEDURE — 99999 PR PBB SHADOW E&M-EST. PATIENT-LVL III: CPT | Mod: PBBFAC,,,

## 2023-04-05 PROCEDURE — 3075F PR MOST RECENT SYSTOLIC BLOOD PRESS GE 130-139MM HG: ICD-10-PCS | Mod: CPTII,S$GLB,,

## 2023-04-05 PROCEDURE — 3079F PR MOST RECENT DIASTOLIC BLOOD PRESSURE 80-89 MM HG: ICD-10-PCS | Mod: CPTII,S$GLB,,

## 2023-04-05 PROCEDURE — 99024 POSTOP FOLLOW-UP VISIT: CPT | Mod: S$GLB,,,

## 2023-04-05 PROCEDURE — 3079F DIAST BP 80-89 MM HG: CPT | Mod: CPTII,S$GLB,,

## 2023-04-05 PROCEDURE — 99024 PR POST-OP FOLLOW-UP VISIT: ICD-10-PCS | Mod: S$GLB,,,

## 2023-04-05 PROCEDURE — 1159F MED LIST DOCD IN RCRD: CPT | Mod: CPTII,S$GLB,,

## 2023-04-05 PROCEDURE — 1159F PR MEDICATION LIST DOCUMENTED IN MEDICAL RECORD: ICD-10-PCS | Mod: CPTII,S$GLB,,

## 2023-04-05 NOTE — PROGRESS NOTES
Maira Rosario presents to Plastic Surgery Clinic on 4/5/2023 for a follow up visit status post bilateral breast reduction on 3/16/2023 by Dr. Casa Mcgrath. She is doing well today with no issues. She denies fever, chills, nausea, vomiting or other systemic signs of infection.     Review of patient's allergies indicates:  No Known Allergies    Current Outpatient Medications on File Prior to Visit   Medication Sig Dispense Refill    amLODIPine (NORVASC) 5 MG tablet 1 tablet Orally Once a day 90 days 90 tablet 3    cetirizine (ZYRTEC) 10 MG tablet Take 10 mg by mouth once daily.      ondansetron (ZOFRAN-ODT) 4 MG TbDL Dissolve 1 tablet (4 mg total) by mouth every 6 (six) hours as needed (nausea). 30 tablet 0    phentermine (ADIPEX-P) 37.5 mg tablet Take 1 tablet (37.5 mg total) by mouth once daily. 30 tablet 0    cephALEXin (KEFLEX) 500 MG capsule Take 1 capsule (500 mg total) by mouth every 6 (six) hours. (Patient not taking: Reported on 4/5/2023) 28 capsule 0    oxyCODONE-acetaminophen (PERCOCET) 5-325 mg per tablet Take 1 tablet by mouth every 4 (four) hours as needed for Pain. (Patient not taking: Reported on 4/5/2023) 28 tablet 0     No current facility-administered medications on file prior to visit.       Patient Active Problem List   Diagnosis    Macromastia       [unfilled]    Social History     Socioeconomic History    Marital status: Single   Tobacco Use    Smoking status: Never    Smokeless tobacco: Never   Substance and Sexual Activity    Alcohol use: Yes     Comment: occassional    Drug use: Never    Sexual activity: Not Currently     Birth control/protection: Abstinence     Date of Surgery: 3/16  Pre op Diagnosis:  1 macromastia  2 chronic neck pain  3 chronic back pain  4 skin rash  Post op Diagnosis:  same  Procedure performed: bilateral breast reduction  Surgeon: Dr Casa Mcgrath  Anesthesia: general  Complications none  Blood loss 150cc    ROS - negative, other than stated  above    PHYSICAL EXAMINATION  Vitals:    04/05/23 1013   BP: 135/84   Pulse: (!) 7     WD WN NAD  VSS  Normal respiratory effort  R breast - incision CDI, no erythema or drainage, nipple viable  L breast - incision CDI, no erythema or drainage, nipple viable     ASSESSMENT/PLAN  22 y.o. F s/p bilateral breast reduction   - Doing well, no issues.   - Bilateral breast drains removed today. Tape also removed.   - Patient may go return to work on Monday.  - RTC x 4 week(s), appointment scheduled.      All questions were answered. The patient was advised to call the clinic with any questions or concerns prior to their next visit.       Cece Georges PA-C  Plastic and Reconstructive Surgery   [phone]

## 2023-04-06 ENCOUNTER — PATIENT MESSAGE (OUTPATIENT)
Dept: PLASTIC SURGERY | Facility: CLINIC | Age: 23
End: 2023-04-06
Payer: COMMERCIAL

## 2023-05-03 ENCOUNTER — OFFICE VISIT (OUTPATIENT)
Dept: PLASTIC SURGERY | Facility: CLINIC | Age: 23
End: 2023-05-03
Payer: COMMERCIAL

## 2023-05-03 VITALS — DIASTOLIC BLOOD PRESSURE: 70 MMHG | SYSTOLIC BLOOD PRESSURE: 129 MMHG | HEART RATE: 80 BPM

## 2023-05-03 DIAGNOSIS — Z09 SURGERY FOLLOW-UP EXAMINATION: Primary | ICD-10-CM

## 2023-05-03 PROCEDURE — 1159F MED LIST DOCD IN RCRD: CPT | Mod: CPTII,S$GLB,,

## 2023-05-03 PROCEDURE — 99999 PR PBB SHADOW E&M-EST. PATIENT-LVL III: CPT | Mod: PBBFAC,,,

## 2023-05-03 PROCEDURE — 99024 POSTOP FOLLOW-UP VISIT: CPT | Mod: S$GLB,,,

## 2023-05-03 PROCEDURE — 1160F PR REVIEW ALL MEDS BY PRESCRIBER/CLIN PHARMACIST DOCUMENTED: ICD-10-PCS | Mod: CPTII,S$GLB,,

## 2023-05-03 PROCEDURE — 3078F DIAST BP <80 MM HG: CPT | Mod: CPTII,S$GLB,,

## 2023-05-03 PROCEDURE — 3074F PR MOST RECENT SYSTOLIC BLOOD PRESSURE < 130 MM HG: ICD-10-PCS | Mod: CPTII,S$GLB,,

## 2023-05-03 PROCEDURE — 1159F PR MEDICATION LIST DOCUMENTED IN MEDICAL RECORD: ICD-10-PCS | Mod: CPTII,S$GLB,,

## 2023-05-03 PROCEDURE — 99024 PR POST-OP FOLLOW-UP VISIT: ICD-10-PCS | Mod: S$GLB,,,

## 2023-05-03 PROCEDURE — 99999 PR PBB SHADOW E&M-EST. PATIENT-LVL III: ICD-10-PCS | Mod: PBBFAC,,,

## 2023-05-03 PROCEDURE — 3074F SYST BP LT 130 MM HG: CPT | Mod: CPTII,S$GLB,,

## 2023-05-03 PROCEDURE — 1160F RVW MEDS BY RX/DR IN RCRD: CPT | Mod: CPTII,S$GLB,,

## 2023-05-03 PROCEDURE — 3078F PR MOST RECENT DIASTOLIC BLOOD PRESSURE < 80 MM HG: ICD-10-PCS | Mod: CPTII,S$GLB,,

## 2023-05-03 NOTE — PROGRESS NOTES
Maira Rosario presents to Plastic Surgery Clinic on 5/3/2023 for a follow up visit status post bilateral breast reduction on 3/16/2023 by Dr. Casa Mcgrath. She is doing well today with no issues. She denies fever, chills, nausea, vomiting or other systemic signs of infection.     Review of patient's allergies indicates:  No Known Allergies    Current Outpatient Medications on File Prior to Visit   Medication Sig Dispense Refill    amLODIPine (NORVASC) 5 MG tablet 1 tablet Orally Once a day 90 days 90 tablet 3    cephALEXin (KEFLEX) 500 MG capsule Take 1 capsule (500 mg total) by mouth every 6 (six) hours. (Patient not taking: Reported on 4/5/2023) 28 capsule 0    cetirizine (ZYRTEC) 10 MG tablet Take 10 mg by mouth once daily.      ondansetron (ZOFRAN-ODT) 4 MG TbDL Dissolve 1 tablet (4 mg total) by mouth every 6 (six) hours as needed (nausea). 30 tablet 0    oxyCODONE-acetaminophen (PERCOCET) 5-325 mg per tablet Take 1 tablet by mouth every 4 (four) hours as needed for Pain. (Patient not taking: Reported on 4/5/2023) 28 tablet 0    phentermine (ADIPEX-P) 37.5 mg tablet Take 1 tablet (37.5 mg total) by mouth once daily. 30 tablet 0     No current facility-administered medications on file prior to visit.       Patient Active Problem List   Diagnosis    Macromastia       Social History     Socioeconomic History    Marital status: Single   Tobacco Use    Smoking status: Never    Smokeless tobacco: Never   Substance and Sexual Activity    Alcohol use: Yes     Comment: occassional    Drug use: Never    Sexual activity: Not Currently     Birth control/protection: Abstinence     Date of Surgery: 3/16  Pre op Diagnosis:  1 macromastia  2 chronic neck pain  3 chronic back pain  4 skin rash  Post op Diagnosis:  same  Procedure performed: bilateral breast reduction  Surgeon: Dr Casa Mcgrath  Anesthesia: general  Complications none  Blood loss 150cc    ROS - negative, other than stated above    PHYSICAL  EXAMINATION  Vitals:    05/03/23 1037   BP: 129/70   Pulse: 80     WD WN NAD  VSS  Normal respiratory effort  R breast - incision CDI, no erythema or drainage, nipple viable  L breast - incision CDI, no erythema or drainage, nipple viable     ASSESSMENT/PLAN  22 y.o. F s/p bilateral breast reduction   - Doing well, no issues.   - Return to clinic as needed.      All questions were answered. The patient was advised to call the clinic with any questions or concerns prior to their next visit.       Cece Georges PA-C  Plastic and Reconstructive Surgery   [phone]

## 2024-04-18 RX ORDER — AZITHROMYCIN 250 MG/1
TABLET, FILM COATED ORAL
Qty: 6 TABLET | Refills: 0 | Status: SHIPPED | OUTPATIENT
Start: 2024-04-18 | End: 2024-04-23

## 2024-08-06 ENCOUNTER — LAB VISIT (OUTPATIENT)
Dept: LAB | Facility: HOSPITAL | Age: 24
End: 2024-08-06
Attending: INTERNAL MEDICINE
Payer: COMMERCIAL

## 2024-08-06 DIAGNOSIS — R10.9 ABDOMINAL PAIN, UNSPECIFIED ABDOMINAL LOCATION: ICD-10-CM

## 2024-08-06 DIAGNOSIS — R10.9 ABDOMINAL PAIN, UNSPECIFIED ABDOMINAL LOCATION: Primary | ICD-10-CM

## 2024-08-06 LAB
BILIRUB UR QL STRIP: NEGATIVE
CLARITY UR REFRACT.AUTO: ABNORMAL
COLOR UR AUTO: YELLOW
GLUCOSE UR QL STRIP: NEGATIVE
HGB UR QL STRIP: NEGATIVE
KETONES UR QL STRIP: NEGATIVE
LEUKOCYTE ESTERASE UR QL STRIP: NEGATIVE
NITRITE UR QL STRIP: NEGATIVE
PH UR STRIP: 7 [PH] (ref 5–8)
PROT UR QL STRIP: ABNORMAL
SP GR UR STRIP: >=1.03 (ref 1–1.03)
URN SPEC COLLECT METH UR: ABNORMAL

## 2024-08-06 PROCEDURE — 81003 URINALYSIS AUTO W/O SCOPE: CPT | Performed by: INTERNAL MEDICINE

## 2024-09-16 DIAGNOSIS — N30.00 ACUTE CYSTITIS WITHOUT HEMATURIA: Primary | ICD-10-CM

## 2024-09-17 ENCOUNTER — LAB VISIT (OUTPATIENT)
Dept: LAB | Facility: HOSPITAL | Age: 24
End: 2024-09-17
Attending: INTERNAL MEDICINE
Payer: COMMERCIAL

## 2024-09-17 DIAGNOSIS — N30.00 ACUTE CYSTITIS WITHOUT HEMATURIA: ICD-10-CM

## 2024-09-17 LAB
BACTERIA #/AREA URNS AUTO: ABNORMAL /HPF
BILIRUB UR QL STRIP: NEGATIVE
CLARITY UR REFRACT.AUTO: ABNORMAL
COLOR UR AUTO: YELLOW
GLUCOSE UR QL STRIP: NEGATIVE
HGB UR QL STRIP: NEGATIVE
HYALINE CASTS UR QL AUTO: 0 /LPF
KETONES UR QL STRIP: NEGATIVE
LEUKOCYTE ESTERASE UR QL STRIP: NEGATIVE
MICROSCOPIC COMMENT: ABNORMAL
NITRITE UR QL STRIP: POSITIVE
PH UR STRIP: 6 [PH] (ref 5–8)
PROT UR QL STRIP: ABNORMAL
RBC #/AREA URNS AUTO: 3 /HPF (ref 0–4)
SP GR UR STRIP: >1.03 (ref 1–1.03)
SQUAMOUS #/AREA URNS AUTO: 18 /HPF
URN SPEC COLLECT METH UR: ABNORMAL
WBC #/AREA URNS AUTO: 10 /HPF (ref 0–5)

## 2024-09-17 PROCEDURE — 81001 URINALYSIS AUTO W/SCOPE: CPT | Performed by: INTERNAL MEDICINE

## 2024-09-18 RX ORDER — CIPROFLOXACIN 500 MG/1
500 TABLET ORAL EVERY 12 HOURS
Qty: 6 TABLET | Refills: 0 | Status: SHIPPED | OUTPATIENT
Start: 2024-09-18 | End: 2024-09-23

## 2024-09-24 ENCOUNTER — PATIENT MESSAGE (OUTPATIENT)
Dept: FAMILY MEDICINE | Facility: CLINIC | Age: 24
End: 2024-09-24
Payer: COMMERCIAL

## 2025-01-04 ENCOUNTER — HOSPITAL ENCOUNTER (EMERGENCY)
Facility: HOSPITAL | Age: 25
Discharge: HOME | End: 2025-01-04
Attending: EMERGENCY MEDICINE
Payer: COMMERCIAL

## 2025-01-04 VITALS
SYSTOLIC BLOOD PRESSURE: 138 MMHG | WEIGHT: 198 LBS | TEMPERATURE: 99 F | RESPIRATION RATE: 20 BRPM | HEART RATE: 84 BPM | HEIGHT: 68 IN | DIASTOLIC BLOOD PRESSURE: 74 MMHG | BODY MASS INDEX: 30.01 KG/M2 | OXYGEN SATURATION: 98 %

## 2025-01-04 DIAGNOSIS — H66.002 ACUTE SUPPURATIVE OTITIS MEDIA OF LEFT EAR WITHOUT SPONTANEOUS RUPTURE OF TYMPANIC MEMBRANE, RECURRENCE NOT SPECIFIED: Primary | ICD-10-CM

## 2025-01-04 DIAGNOSIS — J06.9 URI WITH COUGH AND CONGESTION: ICD-10-CM

## 2025-01-04 LAB
B-HCG UR QL: NEGATIVE
CTP QC/QA: YES
POC RAPID STREP A: NEGATIVE

## 2025-01-04 PROCEDURE — 81025 URINE PREGNANCY TEST: CPT | Mod: ER | Performed by: EMERGENCY MEDICINE

## 2025-01-04 PROCEDURE — 99283 EMERGENCY DEPT VISIT LOW MDM: CPT | Mod: 25,ER

## 2025-01-04 PROCEDURE — 81025 URINE PREGNANCY TEST: CPT | Mod: ER

## 2025-01-04 PROCEDURE — 87880 STREP A ASSAY W/OPTIC: CPT | Mod: ER

## 2025-01-04 RX ORDER — FLUTICASONE PROPIONATE 50 MCG
1 SPRAY, SUSPENSION (ML) NASAL 2 TIMES DAILY
Qty: 16 G | Refills: 0 | Status: SHIPPED | OUTPATIENT
Start: 2025-01-04

## 2025-01-04 RX ORDER — LORATADINE 10 MG/1
10 TABLET ORAL DAILY
Qty: 60 TABLET | Refills: 0 | Status: SHIPPED | OUTPATIENT
Start: 2025-01-04 | End: 2026-01-04

## 2025-01-04 RX ORDER — AMOXICILLIN AND CLAVULANATE POTASSIUM 875; 125 MG/1; MG/1
1 TABLET, FILM COATED ORAL 2 TIMES DAILY
Qty: 20 TABLET | Refills: 0 | Status: SHIPPED | OUTPATIENT
Start: 2025-01-04 | End: 2025-01-14

## 2025-01-04 RX ORDER — ACETAMINOPHEN 500 MG
500 TABLET ORAL EVERY 6 HOURS PRN
Qty: 30 TABLET | Refills: 0 | Status: SHIPPED | OUTPATIENT
Start: 2025-01-04

## 2025-01-04 RX ORDER — IBUPROFEN 600 MG/1
600 TABLET ORAL EVERY 6 HOURS PRN
Qty: 20 TABLET | Refills: 0 | Status: SHIPPED | OUTPATIENT
Start: 2025-01-04

## 2025-01-04 NOTE — ED PROVIDER NOTES
"Encounter Date: 1/4/2025    SCRIBE #1 NOTE: I, Wero Reed, am scribing for, and in the presence of,  Shannon Stanford DO. I have scribed the following portions of the note - Other sections scribed: HPI,ROS,PE.       History     Chief Complaint   Patient presents with    URI     Pt presents w/ a c/o of URI sx (sore throat, fever, and generalized malaise) for approximately three days.     Maira Rosario is a 24 y.o. female with Hx of HTN, who presents to the ED for chief complaint of fever onset 3 days ago. Patient reports associated symptoms of sore throat, cough, congestion, myalgias, chills and weakness. Patient endorses taking Theraflu(last dose "this morning"). She endorses getting the flu vaccine. Denies ear pain.     The history is provided by the patient. No  was used.     Review of patient's allergies indicates:  No Known Allergies  Past Medical History:   Diagnosis Date    Hypertension      Past Surgical History:   Procedure Laterality Date    REDUCTION OF BOTH BREASTS Bilateral 3/16/2023    Procedure: MAMMOPLASTY, REDUCTION, BILATERAL;  Surgeon: Casa Mcgrath MD;  Location: [facility] OR [address] [place] [address];  Service: Plastics;  Laterality: Bilateral;  Left breast tissue 960g  Right breast tissue 1014g    TONSILLECTOMY       Family History   Problem Relation Name Age of Onset    Breast cancer Maternal Grandmother      Breast cancer Paternal Grandmother       Social History     Tobacco Use    Smoking status: Never    Smokeless tobacco: Never   Substance Use Topics    Alcohol use: Yes     Comment: occassional    Drug use: Never     Review of Systems   Constitutional:  Positive for chills and fever.   HENT:  Positive for congestion and sore throat. Negative for ear pain and rhinorrhea.    Eyes:  Negative for redness.   Respiratory:  Positive for cough. Negative for shortness of breath.    Cardiovascular:  Negative for chest pain and leg swelling.   Gastrointestinal:  Negative for abdominal " pain, diarrhea, nausea and vomiting.   Musculoskeletal:  Positive for myalgias. Negative for back pain.   Skin:  Negative for rash.   Neurological:  Positive for weakness. Negative for syncope and headaches.   All other systems reviewed and are negative.      Physical Exam     Initial Vitals [01/04/25 1002]   BP Pulse Resp Temp SpO2   (!) 142/97 87 20 99.2 °F (37.3 °C) 98 %      MAP       --         Patient gave consent to have physical exam performed.   Physical Exam    Nursing note and vitals reviewed.  Constitutional: She appears well-developed and well-nourished.   HENT:   Head: Normocephalic and atraumatic.   Right Ear: External ear normal.   Left Ear: External ear normal. Tympanic membrane is erythematous.   Nose: Mucosal edema and rhinorrhea present. Mouth/Throat: Oropharynx is clear and moist.   Eyes: Conjunctivae and EOM are normal. Pupils are equal, round, and reactive to light.   Neck: Phonation normal. Neck supple.   Normal range of motion.  Cardiovascular:  Normal rate, regular rhythm, normal heart sounds and intact distal pulses.     Exam reveals no gallop and no friction rub.       No murmur heard.  Pulmonary/Chest: Effort normal and breath sounds normal. No stridor. No respiratory distress. She has no wheezes. She has no rhonchi. She has no rales. She exhibits no tenderness.   Abdominal: Abdomen is soft. Bowel sounds are normal. She exhibits no distension. There is no abdominal tenderness. There is no rigidity, no rebound and no guarding.   Musculoskeletal:         General: No tenderness or edema. Normal range of motion.      Cervical back: Normal range of motion and neck supple.     Lymphadenopathy:     She has cervical adenopathy (bilaterally).   Neurological: She is alert and oriented to person, place, and time. She has normal strength. No cranial nerve deficit or sensory deficit. GCS score is 15. GCS eye subscore is 4. GCS verbal subscore is 5. GCS motor subscore is 6.   Skin: Skin is warm and  dry. Capillary refill takes less than 2 seconds. No rash noted.   Psychiatric: She has a normal mood and affect. Her behavior is normal.         ED Course   Procedures  Labs Reviewed   POCT URINE PREGNANCY       Result Value    POC Preg Test, Ur Negative       Acceptable Yes     POCT STREP A, RAPID    POC Rapid Strep A negative            Imaging Results    None          Medications - No data to display  Medical Decision Making  Amount and/or Complexity of Data Reviewed  Labs: ordered. Decision-making details documented in ED Course.    Risk  OTC drugs.  Prescription drug management.    This is an evaluation of a 24 y.o. female that presents to the Emergency Department for   Chief Complaint   Patient presents with    URI     Pt presents w/ a c/o of URI sx (sore throat, fever, and generalized malaise) for approximately three days.     The patient is a non-toxic and well appearing patient. On physical exam, patient appears well hydrated with moist mucus membranes. Neck soft and supple with no meningeal signs or cervical lymphadenopathy. Breath sounds are clear and equal bilaterally with no adventitious breath sounds, tachypnea or respiratory distress with room air pulse ox of 98% and no evidence of hypoxia. TM's without infection. Patient is tolerating PO without difficulty. Physical exam otherwise as above.     Differential diagnosis: Pregnancy, Viral illness, Otitis media, otitis externa, Streptococcal pharyngitis or pharyngitis.     Vital Signs Are Reassuring.  I have reviewed vital signs and nursing notes.     ED Course:Treatment in the ED included Physical Exam and medications given in ED  Medications - No data to display.   Patient reports feeling better after treatment in the ER.     All pertinent external documents reviewed. External Data/Documents Reviewed: Previous medical records and vital signs reviewed, see HPI and Physical exam.     Labs ordered and reviewed. Details documented in the ED  course.    Admission on 01/04/2025, Discharged on 01/04/2025   Component Date Value Ref Range Status    POC Preg Test, Ur 01/04/2025 Negative  Negative Final     Acceptable 01/04/2025 Yes   Final    POC Rapid Strep A 01/04/2025 negative  Positive/Negative Final        Imaging ordered and reviewed. Details documented in the ED course.    Imaging Results    None         Risk: Diagnosis or treatment significantly limited by social determinants of health    In shared decision making with the patient, we discussed treatment, prescriptions, labs, and imaging results.    Discharge home with   ED Prescriptions       Medication Sig Dispense Start Date End Date Auth. Provider    loratadine (CLARITIN) 10 mg tablet Take 1 tablet (10 mg total) by mouth once daily. 60 tablet 1/4/2025 1/4/2026 Shannon Stanford DO    fluticasone propionate (FLONASE) 50 mcg/actuation nasal spray 1 spray (50 mcg total) by Each Nostril route 2 (two) times daily. 16 g 1/4/2025 -- Shannon Stanford DO    acetaminophen (TYLENOL) 500 MG tablet Take 1 tablet (500 mg total) by mouth every 6 (six) hours as needed for Pain (and fever). 30 tablet 1/4/2025 -- Shannon Stanford DO    ibuprofen (ADVIL,MOTRIN) 600 MG tablet Take 1 tablet (600 mg total) by mouth every 6 (six) hours as needed for Pain (Take with food as needed for mild-to-moderate pain). 20 tablet 1/4/2025 -- Shannon Stanford DO    amoxicillin-clavulanate 875-125mg (AUGMENTIN) 875-125 mg per tablet Take 1 tablet by mouth 2 (two) times daily. for 10 days 20 tablet 1/4/2025 1/14/2025 Shannon Stanford DO          Fill and take prescriptions as directed.  Return to the ED if symptoms worsen or do not resolve.   Answered questions and discussed discharge plan.    Patient feels better and is ready for discharge.  Follow up with PCP/specialist in 1 day          Scribe Attestation:   Scribe #1: I performed the above scribed service and the documentation accurately describes the services I performed. I attest  to the accuracy of the note.               I, Dr. Shannon Stanford, personally performed the services described in this documentation. This document was produced by a scribe under my direction and in my presence. All medical record entries made by the scribe were at my direction and in my presence.  I have reviewed the chart and agree that the record reflects my personal performance and is accurate and complete. Shannon Stanford DO.     01/08/2025 6:37 AM                    Clinical Impression:  Final diagnoses:  [H66.002] Acute suppurative otitis media of left ear without spontaneous rupture of tympanic membrane, recurrence not specified (Primary)  [J06.9] URI with cough and congestion          ED Disposition Condition    Discharge Stable          ED Prescriptions       Medication Sig Dispense Start Date End Date Auth. Provider    loratadine (CLARITIN) 10 mg tablet Take 1 tablet (10 mg total) by mouth once daily. 60 tablet 1/4/2025 1/4/2026 Shannon Stanford DO    fluticasone propionate (FLONASE) 50 mcg/actuation nasal spray 1 spray (50 mcg total) by Each Nostril route 2 (two) times daily. 16 g 1/4/2025 -- Shannon Stanford DO    acetaminophen (TYLENOL) 500 MG tablet Take 1 tablet (500 mg total) by mouth every 6 (six) hours as needed for Pain (and fever). 30 tablet 1/4/2025 -- Shannon Stanford DO    ibuprofen (ADVIL,MOTRIN) 600 MG tablet Take 1 tablet (600 mg total) by mouth every 6 (six) hours as needed for Pain (Take with food as needed for mild-to-moderate pain). 20 tablet 1/4/2025 -- Shannon Stanford DO    amoxicillin-clavulanate 875-125mg (AUGMENTIN) 875-125 mg per tablet Take 1 tablet by mouth 2 (two) times daily. for 10 days 20 tablet 1/4/2025 1/14/2025 Shannon Stanford DO          Follow-up Information       Follow up With Specialties Details Why Contact Info Additional Information    [facility] -  Schedule an appointment as soon as possible for a visit in 1 day  [address]  [place]  [address]  [phone]       [facility] Emergency Dept Emergency Medicine Go to  Please go to [facility] emergency department if symptoms worsen [address]  [facility] [place] [id number] [phone]     [facility] Family Medicine Schedule an appointment as soon as possible for a visit in 1 day Please establish a primary care physician [address]  [address]  [facility] [address] [phone] Please park in [address] or [address] and use [address]. Take Medical Office Bldg. elevators.             Shannon Stanford DO  01/08/25 0637

## 2025-01-04 NOTE — Clinical Note
"Maira Rosario (lex) was seen and treated in our emergency department on 1/4/2025.  She may return to work on 01/07/2025.       If you have any questions or concerns, please don't hesitate to call.      Shannon Stanford, DO"

## 2025-04-01 ENCOUNTER — LAB VISIT (OUTPATIENT)
Dept: LAB | Facility: HOSPITAL | Age: 25
End: 2025-04-01
Payer: COMMERCIAL

## 2025-04-01 DIAGNOSIS — I10 ESSENTIAL HYPERTENSION, MALIGNANT: Primary | ICD-10-CM

## 2025-04-01 DIAGNOSIS — Z11.59 SCREENING EXAMINATION FOR POLIOMYELITIS: ICD-10-CM

## 2025-04-01 DIAGNOSIS — I10 ESSENTIAL HYPERTENSION, MALIGNANT: ICD-10-CM

## 2025-04-01 DIAGNOSIS — Z13.0 SCREENING FOR IRON DEFICIENCY ANEMIA: ICD-10-CM

## 2025-04-01 DIAGNOSIS — E66.9 OBESITY, UNSPECIFIED CLASS, UNSPECIFIED OBESITY TYPE, UNSPECIFIED WHETHER SERIOUS COMORBIDITY PRESENT: ICD-10-CM

## 2025-04-01 LAB
ABSOLUTE EOSINOPHIL (OHS): 0.1 K/UL
ABSOLUTE MONOCYTE (OHS): 0.37 K/UL (ref 0.3–1)
ABSOLUTE NEUTROPHIL COUNT (OHS): 3.07 K/UL (ref 1.8–7.7)
ALBUMIN SERPL BCP-MCNC: 3.6 G/DL (ref 3.5–5.2)
ALP SERPL-CCNC: 41 UNIT/L (ref 40–150)
ALT SERPL W/O P-5'-P-CCNC: 9 UNIT/L (ref 10–44)
ANION GAP (OHS): 8 MMOL/L (ref 8–16)
AST SERPL-CCNC: 15 UNIT/L (ref 11–45)
BASOPHILS # BLD AUTO: 0.03 K/UL
BASOPHILS NFR BLD AUTO: 0.5 %
BILIRUB SERPL-MCNC: 0.8 MG/DL (ref 0.1–1)
BUN SERPL-MCNC: 13 MG/DL (ref 6–20)
CALCIUM SERPL-MCNC: 8.9 MG/DL (ref 8.7–10.5)
CHLORIDE SERPL-SCNC: 105 MMOL/L (ref 95–110)
CHOLEST SERPL-MCNC: 138 MG/DL (ref 120–199)
CHOLEST/HDLC SERPL: 2.4 {RATIO} (ref 2–5)
CO2 SERPL-SCNC: 25 MMOL/L (ref 23–29)
CREAT SERPL-MCNC: 0.7 MG/DL (ref 0.5–1.4)
EAG (OHS): 100 MG/DL (ref 68–131)
ERYTHROCYTE [DISTWIDTH] IN BLOOD BY AUTOMATED COUNT: 14.2 % (ref 11.5–14.5)
GFR SERPLBLD CREATININE-BSD FMLA CKD-EPI: >60 ML/MIN/1.73/M2
GLUCOSE SERPL-MCNC: 80 MG/DL (ref 70–110)
HBA1C MFR BLD: 5.1 % (ref 4–5.6)
HCT VFR BLD AUTO: 36.1 % (ref 37–48.5)
HCV AB SERPL QL IA: NORMAL
HDLC SERPL-MCNC: 58 MG/DL (ref 40–75)
HDLC SERPL: 42 % (ref 20–50)
HGB BLD-MCNC: 11.2 GM/DL (ref 12–16)
IMM GRANULOCYTES # BLD AUTO: 0.02 K/UL (ref 0–0.04)
IMM GRANULOCYTES NFR BLD AUTO: 0.4 % (ref 0–0.5)
LDLC SERPL CALC-MCNC: 68.2 MG/DL (ref 63–159)
LYMPHOCYTES # BLD AUTO: 2.08 K/UL (ref 1–4.8)
MCH RBC QN AUTO: 26.5 PG (ref 27–31)
MCHC RBC AUTO-ENTMCNC: 31 G/DL (ref 32–36)
MCV RBC AUTO: 86 FL (ref 82–98)
NONHDLC SERPL-MCNC: 80 MG/DL
NUCLEATED RBC (/100WBC) (OHS): 0 /100 WBC
PLATELET # BLD AUTO: 370 K/UL (ref 150–450)
PMV BLD AUTO: 9.8 FL (ref 9.2–12.9)
POTASSIUM SERPL-SCNC: 3.9 MMOL/L (ref 3.5–5.1)
PROT SERPL-MCNC: 7.2 GM/DL (ref 6–8.4)
RBC # BLD AUTO: 4.22 M/UL (ref 4–5.4)
RELATIVE EOSINOPHIL (OHS): 1.8 %
RELATIVE LYMPHOCYTE (OHS): 36.7 % (ref 18–48)
RELATIVE MONOCYTE (OHS): 6.5 % (ref 4–15)
RELATIVE NEUTROPHIL (OHS): 54.1 % (ref 38–73)
SODIUM SERPL-SCNC: 138 MMOL/L (ref 136–145)
TRIGL SERPL-MCNC: 59 MG/DL (ref 30–150)
WBC # BLD AUTO: 5.67 K/UL (ref 3.9–12.7)

## 2025-04-01 PROCEDURE — 84132 ASSAY OF SERUM POTASSIUM: CPT

## 2025-04-01 PROCEDURE — 80061 LIPID PANEL: CPT

## 2025-04-01 PROCEDURE — 83036 HEMOGLOBIN GLYCOSYLATED A1C: CPT

## 2025-04-01 PROCEDURE — 86803 HEPATITIS C AB TEST: CPT

## 2025-04-01 PROCEDURE — 36415 COLL VENOUS BLD VENIPUNCTURE: CPT

## 2025-04-01 PROCEDURE — 85025 COMPLETE CBC W/AUTO DIFF WBC: CPT

## 2025-05-15 RX ORDER — FLUCONAZOLE 150 MG/1
150 TABLET ORAL ONCE
Qty: 1 TABLET | Refills: 0 | Status: SHIPPED | OUTPATIENT
Start: 2025-05-15 | End: 2025-05-16

## 2025-06-05 DIAGNOSIS — H60.90 OTITIS EXTERNA, UNSPECIFIED CHRONICITY, UNSPECIFIED LATERALITY, UNSPECIFIED TYPE: Primary | ICD-10-CM

## 2025-06-05 RX ORDER — CIPROFLOXACIN AND DEXAMETHASONE 3; 1 MG/ML; MG/ML
4 SUSPENSION/ DROPS AURICULAR (OTIC) 2 TIMES DAILY
Qty: 7.5 ML | Refills: 0 | Status: SHIPPED | OUTPATIENT
Start: 2025-06-05

## 2025-06-06 ENCOUNTER — HOSPITAL ENCOUNTER (EMERGENCY)
Facility: HOSPITAL | Age: 25
Discharge: HOME | End: 2025-06-06
Attending: EMERGENCY MEDICINE
Payer: COMMERCIAL

## 2025-06-06 VITALS
TEMPERATURE: 98 F | OXYGEN SATURATION: 99 % | BODY MASS INDEX: 30.01 KG/M2 | SYSTOLIC BLOOD PRESSURE: 140 MMHG | HEART RATE: 80 BPM | HEIGHT: 68 IN | WEIGHT: 198 LBS | DIASTOLIC BLOOD PRESSURE: 105 MMHG | RESPIRATION RATE: 18 BRPM

## 2025-06-06 DIAGNOSIS — H92.02 LEFT EAR PAIN: Primary | ICD-10-CM

## 2025-06-06 PROCEDURE — 99284 EMERGENCY DEPT VISIT MOD MDM: CPT

## 2025-06-06 PROCEDURE — 25000003 PHARM REV CODE 250: Performed by: EMERGENCY MEDICINE

## 2025-06-06 RX ORDER — AMOXICILLIN AND CLAVULANATE POTASSIUM 875; 125 MG/1; MG/1
1 TABLET, FILM COATED ORAL 2 TIMES DAILY
Qty: 20 TABLET | Refills: 0 | Status: SHIPPED | OUTPATIENT
Start: 2025-06-06 | End: 2025-06-16

## 2025-06-06 RX ORDER — OXYCODONE AND ACETAMINOPHEN 5; 325 MG/1; MG/1
1 TABLET ORAL
Refills: 0 | Status: COMPLETED | OUTPATIENT
Start: 2025-06-06 | End: 2025-06-06

## 2025-06-06 RX ORDER — IBUPROFEN 600 MG/1
600 TABLET, FILM COATED ORAL
Status: DISCONTINUED | OUTPATIENT
Start: 2025-06-06 | End: 2025-06-06 | Stop reason: HOSPADM

## 2025-06-06 RX ORDER — OXYCODONE AND ACETAMINOPHEN 5; 325 MG/1; MG/1
1 TABLET ORAL EVERY 6 HOURS PRN
Qty: 12 TABLET | Refills: 0 | Status: SHIPPED | OUTPATIENT
Start: 2025-06-06 | End: 2025-06-09

## 2025-06-06 RX ORDER — AMOXICILLIN AND CLAVULANATE POTASSIUM 875; 125 MG/1; MG/1
1 TABLET, FILM COATED ORAL
Status: COMPLETED | OUTPATIENT
Start: 2025-06-06 | End: 2025-06-06

## 2025-06-06 RX ADMIN — OXYCODONE HYDROCHLORIDE AND ACETAMINOPHEN 1 TABLET: 5; 325 TABLET ORAL at 02:06

## 2025-06-06 RX ADMIN — AMOXICILLIN AND CLAVULANATE POTASSIUM 1 TABLET: 875; 125 TABLET, FILM COATED ORAL at 02:06

## 2025-06-06 NOTE — Clinical Note
"Maira Rosario (lex) was seen and treated in our emergency department on 6/6/2025.  She may return to work on 06/09/2025.       If you have any questions or concerns, please don't hesitate to call.      Maylin Bender MD"

## 2025-06-06 NOTE — ED PROVIDER NOTES
Source of History:  Patient  Chart    Chief complaint:  Otalgia (Pt arrives c/o left ear pain x 5 days that is now radiating to her mouth. Currently on antibiotics for ear infection.)      HPI:  Maira Rosario is a 24 y.o. female with history of hypertension presenting to emergency department with complaint of atraumatic left ear pain.    Patient states pain began in the left ear 5 days ago.  Pain is severe, not worse with head movement.  No associated fevers.  She denies trauma.  No facial swelling or neck swelling.  No difficulty swallowing.  States that she saw a dentist last week and she was told there were no cavities at that time.    Per chart review, patient had ciprofloxacin and dexamethasone drops ordered yesterday by Dr. Petit.  Patient states that she has used the drops 2 times.  States that she has taken ibuprofen for the pain, last dose at 5:00 p.m..  She also took half of a leftover Norco that she had from a prior dental procedure.  States nothing seems to work for the pain.       Review of patient's allergies indicates:  No Known Allergies    Medications Ordered Prior to Encounter[1]    PMH:  As per HPI and below:  Past Medical History:   Diagnosis Date    Hypertension      Past Surgical History:   Procedure Laterality Date    REDUCTION OF BOTH BREASTS Bilateral 3/16/2023    Procedure: MAMMOPLASTY, REDUCTION, BILATERAL;  Surgeon: Casa Mcgrath MD;  Location: [facility] OR [address] [place] [address];  Service: Plastics;  Laterality: Bilateral;  Left breast tissue 960g  Right breast tissue 1014g    TONSILLECTOMY         Social History     Socioeconomic History    Marital status: Single   Tobacco Use    Smoking status: Never    Smokeless tobacco: Never   Substance and Sexual Activity    Alcohol use: Yes     Comment: occassional    Drug use: Never    Sexual activity: Not Currently     Birth control/protection: Abstinence   Social History Narrative    ** Merged History Encounter **            Family History    Problem Relation Name Age of Onset    Breast cancer Maternal Grandmother      Breast cancer Paternal Grandmother         Physical Exam:      Vitals:    06/06/25 0206   BP:    Pulse:    Resp: 18   Temp:      Gen:  Tearful, nontoxic  Mental Status:  Alert and oriented.  Appropriate, conversant.  Skin: Warm, dry. No rashes seen.  Eyes: No conjunctival injection.  ENT:  No swelling of external auditory canal, no debris present.  No periauricular lymphadenopathy or swelling.  Pain with external manipulation of the pinna.  Left TM is erythematous, there is clear liquid present.  No significant bulging.  Full range of motion of the left jaw.  No point tenderness of the parotid gland, no submandibular tenderness or swelling.  No tenderness to palpation or spasm of the left sternocleidomastoid muscle.  No anterior neck swelling noted.  No trismus.  Pulm: CTAB. No increased work of breathing.  No significant tachypnea.  No audible stridor or wheezing.  No conversational dyspnea.    CV: Regular rate. Regular rhythm.   Neuro: Awake. Speech normal. No focal neuro deficit observed.      Laboratory Studies:  Labs Reviewed - No data to display    Chart reviewed.     Imaging Results    None         Medications Given:  Medications   ibuprofen tablet 600 mg (has no administration in time range)   oxyCODONE-acetaminophen 5-325 mg per tablet 1 tablet (1 tablet Oral Given 6/6/25 0206)   amoxicillin-clavulanate 875-125mg per tablet 1 tablet (1 tablet Oral Given 6/6/25 0207)     MDM:    24 y.o. female with atraumatic left ear pain.  She is afebrile and hemodynamically stable.      Exam is not clearly consistent with otitis externa, there was no swelling of the external auditory canal, no debris present.  She has no periauricular lymphadenopathy, no surrounding cellulitis.  Left tympanic membrane is mildly erythematous with some clear fluid behind it.  She does not have any focal neck tenderness, no SCM spasm.  Presentation does not fit  with torticollis.  Clinically I doubt deep space neck infection as the cause of her complaints today.  Will treat for otitis media.  She does have a ride home, so will give tablet of Percocet here and a few more tablets to go home with.  She received her 1st dose of antibiotic here.  I recommend that she follow up with the ENT.  Discharged in stable condition.    Patient received opioid medications in the emergency department. They were counseled on appropriate use and risks, and were aware that they may not drive home. Informed of need for sober , ride share, etc.    Patient was prescribed opioid medications. They were counseled on appropriate use and risks of these medications.    Diagnostic Impression:    1. Left ear pain         ED Disposition Condition    Discharge Stable          ED Prescriptions       Medication Sig Dispense Start Date End Date Auth. Provider    amoxicillin-clavulanate 875-125mg (AUGMENTIN) 875-125 mg per tablet Take 1 tablet by mouth 2 (two) times daily. for 10 days 20 tablet 6/6/2025 6/16/2025 Maylin Bender MD    oxyCODONE-acetaminophen (PERCOCET) 5-325 mg per tablet Take 1 tablet by mouth every 6 (six) hours as needed for Pain. 12 tablet 6/6/2025 6/9/2025 Maylin Bender MD          Follow-up Information       Follow up With Specialties Details Why Contact Info Additional Information    Your primary care doctor  Schedule an appointment as soon as possible for a visit        [facility] Otolaryngology Schedule an appointment as soon as possible for a visit   [address]  [facility] [address] [phone] Ear, Nose & Throat Services - Main Building, 4th Floor Please park in [facility] and use Clinic elevator            Patient understands the plan and is in agreement, verbalized understanding, questions answered    Maylin Bender MD  Emergency Medicine           [1]   No current facility-administered medications on file prior to  encounter.     Current Outpatient Medications on File Prior to Encounter   Medication Sig Dispense Refill    acetaminophen (TYLENOL) 500 MG tablet Take 1 tablet (500 mg total) by mouth every 6 (six) hours as needed for Pain (and fever). 30 tablet 0    amLODIPine (NORVASC) 10 MG tablet take 1 tablet Orally Once a day 90 days 90 tablet 3    amLODIPine (NORVASC) 5 MG tablet 1 tablet Orally Once a day 90 days 90 tablet 3    amLODIPine (NORVASC) 5 MG tablet 1 tablet Orally Once a day 90 days 90 tablet 1    amoxicillin (AMOXIL) 500 MG Tab Take 1 tablet (500 mg total) by mouth every 8 (eight) hours until all is taken 21 tablet 0    busPIRone (BUSPAR) 10 MG tablet Take 1 tablet by mouth twice a day 30 days (use as needed for anxiety) 60 tablet 1    ciprofloxacin-dexAMETHasone 0.3-0.1% (CIPRODEX) 0.3-0.1 % DrpS Place 4 drops into both ears 2 (two) times daily. 7.5 mL 0    EScitalopram oxalate (LEXAPRO) 10 MG tablet 1 tablet Orally Once a day 30 days 30 tablet 5    fluticasone propionate (FLONASE) 50 mcg/actuation nasal spray 1 spray (50 mcg total) by Each Nostril route 2 (two) times daily. 16 g 0    HYDROcodone-acetaminophen (NORCO) 7.5-325 mg per tablet Take 1 tablet by mouth every 4 to 6 hours as needed for pain 10 tablet 0    ibuprofen (ADVIL,MOTRIN) 600 MG tablet Take 1 tablet (600 mg total) by mouth every 6 (six) hours as needed for Pain (Take with food as needed for mild-to-moderate pain). 20 tablet 0    loratadine (CLARITIN) 10 mg tablet Take 1 tablet (10 mg total) by mouth once daily. 60 tablet 0    ondansetron (ZOFRAN-ODT) 4 MG TbDL Dissolve 1 tablet (4 mg total) by mouth every 6 (six) hours as needed (nausea). 30 tablet 0    oxyCODONE-acetaminophen (PERCOCET) 5-325 mg per tablet Take 1 tablet by mouth every 4 (four) hours as needed for Pain. (Patient not taking: Reported on 4/5/2023) 28 tablet 0    phentermine (ADIPEX-P) 37.5 mg tablet Take 1 tablet (37.5 mg total) by mouth once daily. 30 tablet 0        Napoleon,  Maylin RAMACHANDRAN MD  06/06/25 0544

## 2025-06-06 NOTE — ED TRIAGE NOTES
Pt c/o left ear pain for the past week which has been getting worse. Pt states that pain is radiating to her jaw and it is hard for her to sleep now.

## 2025-06-06 NOTE — DISCHARGE INSTRUCTIONS
Diagnosis: Ear pain    Take ibuprofen (also called Advil, Motrin) for your pain. This medicine is available over-the-counter in 200 mg tablets.  - You may take 600 mg every 6 hours, or 800 mg every 8 hours as needed   - Do not take more than this amount, as it can cause kidney problems, bleeding in your stomach, and other serious problems.   - Do not also take naproxen (Aleve) at the same time or on the same day  - If you have heart problems or uncontrolled high blood pressure, you should not take ibuprofen for more than 3 days without discussing with your doctor    If your pain is not controlled, you may also take Oxycodone.  - Take this medication only when needed for breakthrough pain.   - Do not take more than the prescribed amount to control your pain.  - Do not operate machinery, drive, swim, exercise, perform caregiving, make important decisions or perform important tasks while taking Norco. It can make you drowsy or forgetful, or impair your judgement.  - Oxycodone is addictive in as little as 3 days, so take only as needed.  - Oxycodone can dangerously slow or stop your breathing if you take too much, or if you combine it with alcohol, opioids, or sedating medicines (including Xanex, Ativan) or illegal drugs.   - Oxycodone can make you constipated (hard to poop), so take fiber supplements and a stool softener while taking this medicine.   - This medication may contain acetaminophen (Tylenol). Each pill has 325 mg of acetaminophen. Do not take more than 4,000 mg of acetaminophen within 24 hours as this may lead to liver damage.    Continue using the antibiotic drops. Take the prescribed oral antibiotic as directed.     Treatments you had today:   Medications   oxyCODONE-acetaminophen 5-325 mg per tablet 1 tablet (has no administration in time range)   amoxicillin-clavulanate 875-125mg per tablet 1 tablet (has no administration in time range)   ibuprofen tablet 600 mg (has no administration in time range)        Follow-Up Plan:  - Follow-up with primary care doctor within 3 - 5 days  - Additional testing and/or evaluation as directed by your primary doctor    Return to the Emergency Department for symptoms including but not limited to: worsening symptoms, shortness of breath or chest pain, vomiting with inability to hold down fluids, fevers greater than 100.4°F, passing out/fainting/unconsciousness, or other concerning symptoms.

## 2025-06-11 NOTE — PROGRESS NOTES
Subjective:   Maira Rosario is a 24 y.o. female with PMHx of HTN who presents today for left ear pain. Patient complains of left ear pain that started about 2 weeks ago. Described as sharp shooting pains that were occurring every ~15 minutes. Denies associated URI, fever or hearing concerns. She tried ibuprofen, 1/2 Norco pill and Ciprodex drops with no improvement. Warm compresses helped the pain. Feels that the drops made the pain worse. Per chart review, patient seen at ED 6/6/25 for left ear pain, left TM noted to be erythematous with a clear NIRMALA. She was treated with Augmentin and Percocet. She took the pain medication for 2 days. She notes complete improvement in ear pain at this time. Patient reports having a left sided crown placed about one month. Endorses excessive clenching since because she feels like it is not secure. States she saw her dentist recently and no abnormalities were noted. No hx of TMJ. She denies a history of chronic ear infections or ear surgeries. Denies a history of significant loud noise exposure or ear trauma. Denies hearing concerns, otorrhea, tinnitus or vertigo. Endorses slight aural pressure. Patient reports a history of seasonal allergic rhinitis (worse during the summer). Symptoms include: PND, itchy throat, intermittent nasal congestion. She uses Flonase consistently and Zytrec PRN with some improvement. She has not had allergy testing.       Past Medical History  She has a past medical history of Hypertension.    Past Surgical History  She has a past surgical history that includes Tonsillectomy and Reduction of both breasts (Bilateral, 3/16/2023).    Family History  Her family history includes Breast cancer in her maternal grandmother and paternal grandmother.    Social History  She reports that she has never smoked. She has never used smokeless tobacco. She reports current alcohol use. She reports that she does not use drugs.    Allergies  She has no known  "allergies.    Medications  She has a current medication list which includes the following prescription(s): acetaminophen, amlodipine, amlodipine, amlodipine, amoxicillin, amoxicillin-clavulanate 875-125mg, buspirone, ciprofloxacin-dexamethasone 0.3-0.1%, escitalopram oxalate, fluticasone propionate, hydrocodone-acetaminophen, ibuprofen, loratadine, ondansetron, oxycodone-acetaminophen, and phentermine.    Review of Systems   HENT: Positive for postnasal drip.  Negative for ear discharge, ear infection, ear pain, hearing loss, ringing in the ears, runny nose, sinus infection and stuffy nose.      Neurological: Negative for dizziness.          Objective:       Head:  Atraumatic.     Nose:  Mucosal edema present. No rhinorrhea. Turbinate hypertrophy (R>L).      Mouth/Throat  Oropharynx clear and moist without lesions or asymmetry. Abnormal dentition (fillings). No dental abscesses. Tonsils not present.      Pulmonary/Chest:   Effort normal.     Psychiatric:   Her behavior is normal.       Procedure  None    Audiology     I independently reviewed the tracings of the complete audiometric evaluation performed today. I reviewed the audiogram with the patient as well. Pertinent findings include: normal hearing thresholds bilaterally. SRT 15 dBHL AD and 10 dBHL AS. Speech discrimination 100% AU. Type C tympanogram AD and type A tympanogram AS.     Imaging:   No pertinent imaging available.    Assessment:     1. TMJ tenderness, left    2. Left ear pain    3. Seasonal allergic rhinitis, unspecified trigger    4. Normal hearing exam      Plan:   Maira zepeda" was seen today for otalgia.  Diagnoses and all orders for this visit:    TMJ tenderness, left  Left ear pain  -     Ambulatory referral/consult to ENT  Reassurance provided. Benign otoscopic exam today. No evidence of inflammation of infection. + tenderness to left jaw. Suspect pain secondary to TMJ. Discussed basic TMJ regimen - NSAIDs PRN, warm compresses for 15 minutes " 2-3 times daily, jaw rest, soft diet, OTC dental guard. May consider short term muscle relaxants if symptoms worsen. Follow up with dentist / oral surgeon as needed.     Seasonal allergic rhinitis, unspecified trigger  - Turbinate hypertrophy noted on exam.  - Daily NeilMed saline irrigations with distilled water.  - OTC Flonase 2 SEN daily.  - OTC Azelastine nasal spray. Start with 1 SEN as needed for allergies.   - If symptoms persist, will have her follow up with general ENT and allergy    Normal hearing exam  Audiogram interpretation discussed in detail with the patient. Reassurance provided - normal hearing test with slight negative pressure in the left ear. Recommend optimizing nasal sprays and irrigations.      FOLLOW UP:  Follow up as needed.

## 2025-06-12 ENCOUNTER — OFFICE VISIT (OUTPATIENT)
Dept: OTOLARYNGOLOGY | Facility: CLINIC | Age: 25
End: 2025-06-12
Payer: COMMERCIAL

## 2025-06-12 ENCOUNTER — CLINICAL SUPPORT (OUTPATIENT)
Dept: AUDIOLOGY | Facility: CLINIC | Age: 25
End: 2025-06-12
Payer: COMMERCIAL

## 2025-06-12 DIAGNOSIS — H92.02 LEFT EAR PAIN: ICD-10-CM

## 2025-06-12 DIAGNOSIS — H92.02 LEFT EAR PAIN: Primary | ICD-10-CM

## 2025-06-12 DIAGNOSIS — J30.2 SEASONAL ALLERGIC RHINITIS, UNSPECIFIED TRIGGER: ICD-10-CM

## 2025-06-12 DIAGNOSIS — M26.622 TMJ TENDERNESS, LEFT: Primary | ICD-10-CM

## 2025-06-12 DIAGNOSIS — H93.293 ABNORMAL AUDITORY PERCEPTION OF BOTH EARS: ICD-10-CM

## 2025-06-12 DIAGNOSIS — Z01.10 NORMAL HEARING EXAM: ICD-10-CM

## 2025-06-12 PROCEDURE — 99999 PR PBB SHADOW E&M-EST. PATIENT-LVL I: CPT | Mod: PBBFAC,,,

## 2025-06-12 PROCEDURE — 92557 COMPREHENSIVE HEARING TEST: CPT | Mod: S$GLB,,,

## 2025-06-12 PROCEDURE — 92567 TYMPANOMETRY: CPT | Mod: S$GLB,,,

## 2025-06-12 PROCEDURE — 99999 PR PBB SHADOW E&M-EST. PATIENT-LVL III: CPT | Mod: PBBFAC,,,

## 2025-06-12 NOTE — PROGRESS NOTES
Maira Rosario was seen today in the clinic for an audiologic evaluation.  Patient's main complaint was left sided otalgia with associated aural pressure.  Ms. Rosario reported issues with her sinuses that she takes medication for. She denied any other otologic symptoms.     Tympanometry revealed Type C in the right ear and Type A in the left ear.     Audiogram results revealed normal hearing sensitivity bilaterally.      Speech reception thresholds were noted at 15 dB in the right ear and 10 dB in the left ear.    Speech discrimination scores were 100% in the right ear and 100% in the left ear.    Recommendations:  Otologic evaluation  Repeat audiogram as needed  Hearing protection when in noise

## 2025-07-08 ENCOUNTER — OFFICE VISIT (OUTPATIENT)
Dept: INTERNAL MEDICINE | Facility: CLINIC | Age: 25
End: 2025-07-08
Payer: COMMERCIAL

## 2025-07-08 ENCOUNTER — LAB VISIT (OUTPATIENT)
Dept: LAB | Facility: HOSPITAL | Age: 25
End: 2025-07-08
Payer: COMMERCIAL

## 2025-07-08 VITALS
OXYGEN SATURATION: 98 % | BODY MASS INDEX: 31.64 KG/M2 | HEART RATE: 92 BPM | DIASTOLIC BLOOD PRESSURE: 86 MMHG | WEIGHT: 208.75 LBS | SYSTOLIC BLOOD PRESSURE: 140 MMHG | HEIGHT: 68 IN

## 2025-07-08 DIAGNOSIS — F41.9 ANXIETY: ICD-10-CM

## 2025-07-08 DIAGNOSIS — I10 HYPERTENSION, UNSPECIFIED TYPE: ICD-10-CM

## 2025-07-08 DIAGNOSIS — R07.0 THROAT PAIN: ICD-10-CM

## 2025-07-08 DIAGNOSIS — Z00.00 HEALTHCARE MAINTENANCE: ICD-10-CM

## 2025-07-08 DIAGNOSIS — R07.0 THROAT PAIN: Primary | ICD-10-CM

## 2025-07-08 LAB
HIV 1+2 AB+HIV1 P24 AG SERPL QL IA: NORMAL
TSH SERPL-ACNC: 1.03 UIU/ML (ref 0.4–4)

## 2025-07-08 PROCEDURE — 87389 HIV-1 AG W/HIV-1&-2 AB AG IA: CPT

## 2025-07-08 PROCEDURE — 36415 COLL VENOUS BLD VENIPUNCTURE: CPT

## 2025-07-08 PROCEDURE — 99999 PR PBB SHADOW E&M-EST. PATIENT-LVL III: CPT | Mod: PBBFAC,,,

## 2025-07-08 PROCEDURE — 84443 ASSAY THYROID STIM HORMONE: CPT

## 2025-07-08 NOTE — PROGRESS NOTES
Subjective     Chief Complaint:    History of Present Illness:  Ms. Maira Rosario is a 24 y.o. female  who is a known case of HTN, NAVARRO (on lexapro), and breast reduction who is here due to complaints of throat pain that started on Sunday. She denied fever, chest pain, cough, mucus, contact with sick individuals.     Active issues  Throat pain:  COVID, influenza A and B, strep negative    HTN:  Follows with Dr Packer from nephrology  Stable on amlodipine 5mg daily      PAST HISTORY:     Past Medical History:   Diagnosis Date    Hypertension        Past Surgical History:   Procedure Laterality Date    REDUCTION OF BOTH BREASTS Bilateral 3/16/2023    Procedure: MAMMOPLASTY, REDUCTION, BILATERAL;  Surgeon: Casa Mcgrath MD;  Location: [facility] OR [address] [place] [address];  Service: Plastics;  Laterality: Bilateral;  Left breast tissue 960g  Right breast tissue 1014g    TONSILLECTOMY         Family History   Problem Relation Name Age of Onset    Breast cancer Maternal Grandmother      Breast cancer Paternal Grandmother         Social History     Socioeconomic History    Marital status: Single   Tobacco Use    Smoking status: Never    Smokeless tobacco: Never   Substance and Sexual Activity    Alcohol use: Yes     Comment: occassional    Drug use: Never    Sexual activity: Not Currently     Birth control/protection: Abstinence   Social History Narrative    ** Merged History Encounter **          Social Drivers of Health     Financial Resource Strain: Medium Risk (6/11/2025)    Overall Financial Resource Strain (CARDIA)     Difficulty of Paying Living Expenses: Somewhat hard   Food Insecurity: No Food Insecurity (6/11/2025)    Hunger Vital Sign     Worried About Running Out of Food in the Last Year: Never true     Ran Out of Food in the Last Year: Never true   Transportation Needs: No Transportation Needs (6/11/2025)    PRAPARE - Transportation     Lack of Transportation (Medical): No     Lack of Transportation (Non-Medical): No    Physical Activity: Insufficiently Active (6/11/2025)    Exercise Vital Sign     Days of Exercise per Week: 5 days     Minutes of Exercise per Session: 10 min   Stress: Stress Concern Present (6/11/2025)    [employer] [place] [employer] - Occupational Stress Questionnaire     Feeling of Stress : To some extent   Housing Stability: High Risk (6/11/2025)    Housing Stability Vital Sign     Unable to Pay for Housing in the Last Year: Yes     Number of Times Moved in the Last Year: 1     Homeless in the Last Year: No       MEDICATIONS & ALLERGIES:     Current Outpatient Medications on File Prior to Visit   Medication Sig    acetaminophen (TYLENOL) 500 MG tablet Take 1 tablet (500 mg total) by mouth every 6 (six) hours as needed for Pain (and fever).    amLODIPine (NORVASC) 10 MG tablet take 1 tablet Orally Once a day 90 days    amLODIPine (NORVASC) 5 MG tablet 1 tablet Orally Once a day 90 days    amLODIPine (NORVASC) 5 MG tablet 1 tablet Orally Once a day 90 days    amoxicillin (AMOXIL) 500 MG Tab Take 1 tablet (500 mg total) by mouth every 8 (eight) hours until all is taken    busPIRone (BUSPAR) 10 MG tablet Take 1 tablet by mouth twice a day 30 days (use as needed for anxiety)    ciprofloxacin-dexAMETHasone 0.3-0.1% (CIPRODEX) 0.3-0.1 % DrpS Place 4 drops into both ears 2 (two) times daily.    EScitalopram oxalate (LEXAPRO) 10 MG tablet 1 tablet Orally Once a day 30 days    fluticasone propionate (FLONASE) 50 mcg/actuation nasal spray 1 spray (50 mcg total) by Each Nostril route 2 (two) times daily.    HYDROcodone-acetaminophen (NORCO) 7.5-325 mg per tablet Take 1 tablet by mouth every 4 to 6 hours as needed for pain    ibuprofen (ADVIL,MOTRIN) 600 MG tablet Take 1 tablet (600 mg total) by mouth every 6 (six) hours as needed for Pain (Take with food as needed for mild-to-moderate pain).    loratadine (CLARITIN) 10 mg tablet Take 1 tablet (10 mg total) by mouth once daily.    ondansetron (ZOFRAN-ODT) 4 MG  "TbDL Dissolve 1 tablet (4 mg total) by mouth every 6 (six) hours as needed (nausea).    oxyCODONE-acetaminophen (PERCOCET) 5-325 mg per tablet Take 1 tablet by mouth every 4 (four) hours as needed for Pain. (Patient not taking: Reported on 4/5/2023)    phentermine (ADIPEX-P) 37.5 mg tablet Take 1 tablet (37.5 mg total) by mouth once daily.     No current facility-administered medications on file prior to visit.       Review of patient's allergies indicates:  No Known Allergies    OBJECTIVE:     Vital Signs:  Vitals:    07/08/25 1608   BP: (!) 140/86   BP Location: Right arm   Patient Position: Sitting   Pulse: 92   SpO2: 98%   Weight: 94.7 kg (208 lb 12.4 oz)   Height: 5' 8" (1.727 m)       Body mass index is 31.74 kg/m².     Physical Exam:  General:  Well developed, well nourished, no acute distress  Head: Normocephalic, atraumatic  Eyes: PERRL, EOMI, clear sclera  Throat: minimal  posterior pharyngeal erythema or exudate, no tonsillar exudate, no pus pockets  Neck: supple, normal ROM, no thyromegaly   CVS: S1 and S2 normal, no murmurs, rubs, gallops, 2+ peripheral pulses  Resp:  Lungs clear to auscultation, no wheezes, rales, rhonchi, cough  GI:  Abdomen soft, non-tender, non-distended, normoactive bowel sounds  MSK:  No muscle atrophy, cyanosis, peripheral edema   Skin:  No rashes, ulcers, erythema  Neuro:  No focal deficits noted  Psych:  Appropriate mood and affect, normal judgement    Laboratory  Lab Results   Component Value Date    WBC 5.67 04/01/2025    HGB 11.2 (L) 04/01/2025    HCT 36.1 (L) 04/01/2025    MCV 86 04/01/2025     04/01/2025     @[id number](GLU,NA,K,Cl,CO2,BUN,Creatinine,Calcium,MG)@  No results found for: "INR", "PROTIME"  Lab Results   Component Value Date    HGBA1C 5.1 04/01/2025     No results for input(s): "POCTGLUCOSE" in the last 72 hours.    Diagnostic Results:  Labs: Reviewed    ASSESSMENT & PLAN:   Ms. Maira Rosario is a 24 y.o. female who presents today with throat " pain.    Throat pain  Likely due to allergy vs viral sickness. Covid, flu and strep negative. Patient advised with over the counter supportive care. Patient counseled about red flag symptoms for which she should then seek ED emergent care. She expressed understanding.          RTC with PCP    Case discussed with Dr Kenya Kaiser MD  Internal Medicine PGY3  [facility]  [address] [place] [address] [id number] [phone]

## 2025-07-10 PROBLEM — F41.9 ANXIETY: Status: ACTIVE | Noted: 2025-07-10

## 2025-07-10 PROBLEM — I10 HTN (HYPERTENSION): Status: ACTIVE | Noted: 2025-07-10

## 2025-07-10 LAB
CTP QC/QA: YES
FLUAV AG NPH QL: NEGATIVE
FLUBV AG NPH QL: NEGATIVE
S PYO RRNA THROAT QL PROBE: NEGATIVE
SARS-COV-2 RDRP RESP QL NAA+PROBE: NEGATIVE

## 2025-08-29 ENCOUNTER — OFFICE VISIT (OUTPATIENT)
Dept: OBSTETRICS AND GYNECOLOGY | Facility: CLINIC | Age: 25
End: 2025-08-29
Payer: COMMERCIAL

## 2025-08-29 VITALS
BODY MASS INDEX: 32.41 KG/M2 | WEIGHT: 213.19 LBS | DIASTOLIC BLOOD PRESSURE: 98 MMHG | SYSTOLIC BLOOD PRESSURE: 140 MMHG

## 2025-08-29 DIAGNOSIS — Z12.4 ENCOUNTER FOR PAPANICOLAOU SMEAR FOR CERVICAL CANCER SCREENING: ICD-10-CM

## 2025-08-29 DIAGNOSIS — Z01.419 WELL WOMAN EXAM: Primary | ICD-10-CM

## 2025-08-29 DIAGNOSIS — Z11.3 ROUTINE SCREENING FOR STI (SEXUALLY TRANSMITTED INFECTION): ICD-10-CM

## 2025-08-29 PROCEDURE — 99999 PR PBB SHADOW E&M-EST. PATIENT-LVL II: CPT | Mod: PBBFAC,,, | Performed by: OBSTETRICS & GYNECOLOGY

## 2025-08-29 RX ORDER — TIRZEPATIDE 2.5 MG/0.1
SYRINGE (ML) SUBCUTANEOUS
COMMUNITY

## (undated) DEVICE — DRAIN CHANNEL ROUND 15FR

## (undated) DEVICE — MANIFOLD 4 PORT

## (undated) DEVICE — SYR 30CC LUER LOCK

## (undated) DEVICE — BOVIE SUCTION

## (undated) DEVICE — TRAY CATH FOL SIL URIMTR 16FR

## (undated) DEVICE — EVACUATOR WOUND BULB 100CC

## (undated) DEVICE — SPONGE LAP 18X18 PREWASHED

## (undated) DEVICE — REMOVER STAPLE SKIN STERILE

## (undated) DEVICE — SUT SILK 2-0 PS 18IN BLACK

## (undated) DEVICE — NDL SPINAL SPINOCAN 22GX3.5

## (undated) DEVICE — SUT MCRYL PLUS 4-0 PS2 27IN

## (undated) DEVICE — STAPLER SKIN ROTATING HEAD

## (undated) DEVICE — ELECTRODE REM PLYHSV RETURN 9

## (undated) DEVICE — BRA STYLE 7 CUP EXTRA LARGE

## (undated) DEVICE — DRESSING XEROFORM NONADH 1X8IN

## (undated) DEVICE — DRAPE HALF SURGICAL 40X58IN

## (undated) DEVICE — BLADE SURG #15 CARBON STEEL

## (undated) DEVICE — BLADE SURG CARBON STEEL #10

## (undated) DEVICE — BANDAGE ROLL COTTN 4.5INX4.1YD

## (undated) DEVICE — GOWN AERO CHROME W/ TOWEL XL

## (undated) DEVICE — STAPLER SKIN PROXIMATE WIDE

## (undated) DEVICE — APPLIER CLIP LIAGCLIP 9.375IN

## (undated) DEVICE — TRAY MINOR GEN SURG OMC

## (undated) DEVICE — TUBING NEPTUNE 2 SMOKE 10IN

## (undated) DEVICE — BANDAGE KERLIX P/P 2.25IN STER

## (undated) DEVICE — PACK UNIVERSAL SPLIT II

## (undated) DEVICE — SPONGE DERMACEA GAUZE 4X4

## (undated) DEVICE — SUT MONOCRYL 3-0 PS-2 UND

## (undated) DEVICE — SKINMARKER & RULER REGULAR X-F

## (undated) DEVICE — SYS PRINEO SKIN CLOSURE

## (undated) DEVICE — SUT 5/0 18IN PLAIN FAST AB

## (undated) DEVICE — PAD ABDOMINAL STERILE 8X10IN

## (undated) DEVICE — TIP YANKAUERS BULB NO VENT